# Patient Record
Sex: MALE | Race: BLACK OR AFRICAN AMERICAN | NOT HISPANIC OR LATINO | URBAN - METROPOLITAN AREA
[De-identification: names, ages, dates, MRNs, and addresses within clinical notes are randomized per-mention and may not be internally consistent; named-entity substitution may affect disease eponyms.]

---

## 2021-03-20 ENCOUNTER — INPATIENT (INPATIENT)
Facility: HOSPITAL | Age: 41
LOS: 0 days | Discharge: OTHER INSTITUTION | DRG: 313 | End: 2021-03-21
Attending: HOSPITALIST | Admitting: HOSPITALIST
Payer: COMMERCIAL

## 2021-03-20 VITALS
HEART RATE: 104 BPM | WEIGHT: 210.1 LBS | DIASTOLIC BLOOD PRESSURE: 81 MMHG | HEIGHT: 71 IN | SYSTOLIC BLOOD PRESSURE: 129 MMHG | OXYGEN SATURATION: 93 % | TEMPERATURE: 102 F | RESPIRATION RATE: 16 BRPM

## 2021-03-20 DIAGNOSIS — U07.1 COVID-19: ICD-10-CM

## 2021-03-20 LAB
ALBUMIN SERPL ELPH-MCNC: 3.4 G/DL — SIGNIFICANT CHANGE UP (ref 3.3–5)
ALP SERPL-CCNC: 62 U/L — SIGNIFICANT CHANGE UP (ref 40–120)
ALT FLD-CCNC: 100 U/L — HIGH (ref 10–45)
ANION GAP SERPL CALC-SCNC: 15 MMOL/L — SIGNIFICANT CHANGE UP (ref 5–17)
AST SERPL-CCNC: 119 U/L — HIGH (ref 10–40)
BASOPHILS # BLD AUTO: 0 K/UL — SIGNIFICANT CHANGE UP (ref 0–0.2)
BASOPHILS NFR BLD AUTO: 0 % — SIGNIFICANT CHANGE UP (ref 0–2)
BILIRUB SERPL-MCNC: 0.6 MG/DL — SIGNIFICANT CHANGE UP (ref 0.2–1.2)
BUN SERPL-MCNC: 12 MG/DL — SIGNIFICANT CHANGE UP (ref 7–23)
CALCIUM SERPL-MCNC: 8.9 MG/DL — SIGNIFICANT CHANGE UP (ref 8.4–10.5)
CHLORIDE SERPL-SCNC: 96 MMOL/L — SIGNIFICANT CHANGE UP (ref 96–108)
CO2 SERPL-SCNC: 23 MMOL/L — SIGNIFICANT CHANGE UP (ref 22–31)
CREAT SERPL-MCNC: 1.23 MG/DL — SIGNIFICANT CHANGE UP (ref 0.5–1.3)
CRP SERPL-MCNC: 2.44 MG/DL — HIGH (ref 0–0.4)
D DIMER BLD IA.RAPID-MCNC: 243 NG/ML DDU — HIGH
EOSINOPHIL # BLD AUTO: 0 K/UL — SIGNIFICANT CHANGE UP (ref 0–0.5)
EOSINOPHIL NFR BLD AUTO: 0 % — SIGNIFICANT CHANGE UP (ref 0–6)
GAS PNL BLDV: SIGNIFICANT CHANGE UP
GLUCOSE SERPL-MCNC: 127 MG/DL — HIGH (ref 70–99)
HCT VFR BLD CALC: 47.6 % — SIGNIFICANT CHANGE UP (ref 39–50)
HGB BLD-MCNC: 15.8 G/DL — SIGNIFICANT CHANGE UP (ref 13–17)
IMM GRANULOCYTES NFR BLD AUTO: 0.2 % — SIGNIFICANT CHANGE UP (ref 0–1.5)
LYMPHOCYTES # BLD AUTO: 1.17 K/UL — SIGNIFICANT CHANGE UP (ref 1–3.3)
LYMPHOCYTES # BLD AUTO: 27.3 % — SIGNIFICANT CHANGE UP (ref 13–44)
MCHC RBC-ENTMCNC: 28.8 PG — SIGNIFICANT CHANGE UP (ref 27–34)
MCHC RBC-ENTMCNC: 33.2 GM/DL — SIGNIFICANT CHANGE UP (ref 32–36)
MCV RBC AUTO: 86.9 FL — SIGNIFICANT CHANGE UP (ref 80–100)
MONOCYTES # BLD AUTO: 0.35 K/UL — SIGNIFICANT CHANGE UP (ref 0–0.9)
MONOCYTES NFR BLD AUTO: 8.2 % — SIGNIFICANT CHANGE UP (ref 2–14)
NEUTROPHILS # BLD AUTO: 2.76 K/UL — SIGNIFICANT CHANGE UP (ref 1.8–7.4)
NEUTROPHILS NFR BLD AUTO: 64.3 % — SIGNIFICANT CHANGE UP (ref 43–77)
NRBC # BLD: 0 /100 WBCS — SIGNIFICANT CHANGE UP (ref 0–0)
PLATELET # BLD AUTO: 174 K/UL — SIGNIFICANT CHANGE UP (ref 150–400)
POTASSIUM SERPL-MCNC: 4.2 MMOL/L — SIGNIFICANT CHANGE UP (ref 3.5–5.3)
POTASSIUM SERPL-SCNC: 4.2 MMOL/L — SIGNIFICANT CHANGE UP (ref 3.5–5.3)
PROCALCITONIN SERPL-MCNC: 0.15 NG/ML — HIGH (ref 0.02–0.1)
PROT SERPL-MCNC: 7.6 G/DL — SIGNIFICANT CHANGE UP (ref 6–8.3)
RBC # BLD: 5.48 M/UL — SIGNIFICANT CHANGE UP (ref 4.2–5.8)
RBC # FLD: 12.8 % — SIGNIFICANT CHANGE UP (ref 10.3–14.5)
SARS-COV-2 RNA SPEC QL NAA+PROBE: DETECTED
SODIUM SERPL-SCNC: 134 MMOL/L — LOW (ref 135–145)
TROPONIN T, HIGH SENSITIVITY RESULT: <6 NG/L — SIGNIFICANT CHANGE UP (ref 0–51)
WBC # BLD: 4.29 K/UL — SIGNIFICANT CHANGE UP (ref 3.8–10.5)
WBC # FLD AUTO: 4.29 K/UL — SIGNIFICANT CHANGE UP (ref 3.8–10.5)

## 2021-03-20 PROCEDURE — 93010 ELECTROCARDIOGRAM REPORT: CPT | Mod: NC

## 2021-03-20 PROCEDURE — 99285 EMERGENCY DEPT VISIT HI MDM: CPT

## 2021-03-20 PROCEDURE — 71045 X-RAY EXAM CHEST 1 VIEW: CPT | Mod: 26

## 2021-03-20 PROCEDURE — 71275 CT ANGIOGRAPHY CHEST: CPT | Mod: 26,MA

## 2021-03-20 RX ORDER — DEXAMETHASONE 0.5 MG/5ML
6 ELIXIR ORAL ONCE
Refills: 0 | Status: COMPLETED | OUTPATIENT
Start: 2021-03-20 | End: 2021-03-20

## 2021-03-20 RX ORDER — ACETAMINOPHEN 500 MG
650 TABLET ORAL ONCE
Refills: 0 | Status: COMPLETED | OUTPATIENT
Start: 2021-03-20 | End: 2021-03-20

## 2021-03-20 RX ADMIN — Medication 650 MILLIGRAM(S): at 17:49

## 2021-03-20 RX ADMIN — Medication 6 MILLIGRAM(S): at 17:49

## 2021-03-20 NOTE — ED PROVIDER NOTE - CLINICAL SUMMARY MEDICAL DECISION MAKING FREE TEXT BOX
45M w/ h/o unprovoked PE, pw sob, covid + 9 days ago, 9 days of symptoms, hypoxic, could be covid related, could be PE, will obtain PE as patient is high risk, hypoxia could be combination of both, improved on NC, no hypotension, will monitor for now, tba

## 2021-03-20 NOTE — ED PROVIDER NOTE - PHYSICAL EXAMINATION
CONSTITUTIONAL: NAD, awake, alert  HEAD: Normocephalic; atraumatic  ENMT: External appears normal, MMM  CARD: Normal Sl, S2; no audible murmurs  RESP: hypoxia, on NC 4L sating well  ABD: soft, non-distended; non-tender  MSK: no edema, normal ROM in all four extremities  SKIN: Warm, dry, no rashes  NEURO: aaox3, moving all extremities spontaneously

## 2021-03-20 NOTE — ED PROVIDER NOTE - ATTENDING CONTRIBUTION TO CARE
I performed a history and physical exam of the patient and discussed their management with the resident. I reviewed the resident's note and agree with the documented findings and plan of care. My medical decision making and observations are found above.    40M hx remote pes unclear etiology not on AC p/w sob and cp x1w. Cp feels like prior pe. Also has fevers/chills/cough. cp worse with cough/deep breathing. +sick covid contacts at work last week. Denies leg swelling, recent travel/hospitalizations. On exam, appears weak, slightly tachypneic 88% on RA 96% on NC. cv rrr no m/r/g lungs bibasilar rales and mild accessory muscle use. Abd soft, ntnd. no lower extremity edema. 2+ distal pulses. Covid vs bacterial pna vs pe vs myocarditis. will get labs, cta, supportive care, TBA.

## 2021-03-20 NOTE — ED ADULT NURSE NOTE - OBJECTIVE STATEMENT
Patient  is  alert  and  oriented x3.  Color is  good  and skin warm to touch. He  is  c/o  fever, cough and  SOB.  Patient  was  hypoxic  upon  to  ER  arrival.

## 2021-03-20 NOTE — ED PROVIDER NOTE - OBJECTIVE STATEMENT
40M w/ h/o unprovoked PE in the past, treated with coumadin which is now dc'ed, presents w/ 9 days of SOB, fevers, chills, chest pressure, body aches. Denies recent immobilization, surgeries. States his shortness of breath is worsening. Denies lightheadedness

## 2021-03-20 NOTE — ED PROVIDER NOTE - PROGRESS NOTE DETAILS
Resident Allan: preliminary evaluation consistent with Acute Hypoxemic Resp Failure in the setting of COVID-19. Stable on 4L NC, does not require NPPV at this time. Case endorsed to Hospitalist (MD Ortiz). Stable for floor at present, pending CTA Chest read.

## 2021-03-20 NOTE — ED PROVIDER NOTE - NS ED ROS FT
General: + fever, + chills  HENT: + nasal congestion, + rhinorrhea  CV: + chest pain, denies palpitations  Resp: + difficulty breathing, + cough  Abdominal: denies nausea, vomiting, diarrhea, abdominal pain  MSK: denies muscle aches, leg swelling  Neuro: denies headaches, numbness, tingling  Skin: denies rashes, bruises  Heme: denies petechia, abnormal bleeding

## 2021-03-20 NOTE — ED ADULT NURSE REASSESSMENT NOTE - NS ED NURSE REASSESS COMMENT FT1
Pt resting comfortably without complaints & does not appear to be in any acute distress at this time with VSS. Pt aware that he is currently pending CT scan.

## 2021-03-21 ENCOUNTER — INPATIENT (INPATIENT)
Facility: HOSPITAL | Age: 41
LOS: 2 days | Discharge: HOME | End: 2021-03-24
Attending: INTERNAL MEDICINE | Admitting: INTERNAL MEDICINE
Payer: COMMERCIAL

## 2021-03-21 VITALS
SYSTOLIC BLOOD PRESSURE: 105 MMHG | OXYGEN SATURATION: 95 % | RESPIRATION RATE: 18 BRPM | HEART RATE: 76 BPM | DIASTOLIC BLOOD PRESSURE: 73 MMHG | TEMPERATURE: 98 F

## 2021-03-21 VITALS
RESPIRATION RATE: 20 BRPM | WEIGHT: 183.87 LBS | SYSTOLIC BLOOD PRESSURE: 138 MMHG | TEMPERATURE: 99 F | DIASTOLIC BLOOD PRESSURE: 86 MMHG | OXYGEN SATURATION: 97 % | HEIGHT: 71 IN | HEART RATE: 73 BPM

## 2021-03-21 DIAGNOSIS — R74.01 ELEVATION OF LEVELS OF LIVER TRANSAMINASE LEVELS: ICD-10-CM

## 2021-03-21 DIAGNOSIS — Z29.9 ENCOUNTER FOR PROPHYLACTIC MEASURES, UNSPECIFIED: ICD-10-CM

## 2021-03-21 DIAGNOSIS — U07.1 COVID-19: ICD-10-CM

## 2021-03-21 LAB
ALBUMIN SERPL ELPH-MCNC: 3.5 G/DL — SIGNIFICANT CHANGE UP (ref 3.3–5)
ALBUMIN SERPL ELPH-MCNC: 3.9 G/DL — SIGNIFICANT CHANGE UP (ref 3.3–5)
ALP SERPL-CCNC: 36 U/L — LOW (ref 40–120)
ALP SERPL-CCNC: 66 U/L — SIGNIFICANT CHANGE UP (ref 40–120)
ALT FLD-CCNC: 108 U/L — HIGH (ref 10–45)
ALT FLD-CCNC: 110 U/L — HIGH (ref 10–45)
ANION GAP SERPL CALC-SCNC: 14 MMOL/L — SIGNIFICANT CHANGE UP (ref 5–17)
ANION GAP SERPL CALC-SCNC: 23 MMOL/L — HIGH (ref 5–17)
APTT BLD: 34 SEC — SIGNIFICANT CHANGE UP (ref 27.5–35.5)
AST SERPL-CCNC: 113 U/L — HIGH (ref 10–40)
AST SERPL-CCNC: 93 U/L — HIGH (ref 10–40)
BASOPHILS # BLD AUTO: 0 K/UL — SIGNIFICANT CHANGE UP (ref 0–0.2)
BASOPHILS NFR BLD AUTO: 0 % — SIGNIFICANT CHANGE UP (ref 0–2)
BILIRUB SERPL-MCNC: 0.7 MG/DL — SIGNIFICANT CHANGE UP (ref 0.2–1.2)
BILIRUB SERPL-MCNC: 0.7 MG/DL — SIGNIFICANT CHANGE UP (ref 0.2–1.2)
BUN SERPL-MCNC: 17 MG/DL — SIGNIFICANT CHANGE UP (ref 7–23)
BUN SERPL-MCNC: 19 MG/DL — SIGNIFICANT CHANGE UP (ref 7–23)
CA-I BLD-SCNC: 1.2 MMOL/L — SIGNIFICANT CHANGE UP (ref 1.12–1.3)
CALCIUM SERPL-MCNC: 9.6 MG/DL — SIGNIFICANT CHANGE UP (ref 8.4–10.5)
CALCIUM SERPL-MCNC: <3 MG/DL — CRITICAL LOW (ref 8.4–10.5)
CHLORIDE SERPL-SCNC: 88 MMOL/L — LOW (ref 96–108)
CHLORIDE SERPL-SCNC: 96 MMOL/L — SIGNIFICANT CHANGE UP (ref 96–108)
CO2 SERPL-SCNC: 18 MMOL/L — LOW (ref 22–31)
CO2 SERPL-SCNC: 27 MMOL/L — SIGNIFICANT CHANGE UP (ref 22–31)
CREAT SERPL-MCNC: 0.94 MG/DL — SIGNIFICANT CHANGE UP (ref 0.5–1.3)
CREAT SERPL-MCNC: 0.98 MG/DL — SIGNIFICANT CHANGE UP (ref 0.5–1.3)
EOSINOPHIL # BLD AUTO: 0 K/UL — SIGNIFICANT CHANGE UP (ref 0–0.5)
EOSINOPHIL NFR BLD AUTO: 0 % — SIGNIFICANT CHANGE UP (ref 0–6)
FERRITIN SERPL-MCNC: 1209 NG/ML — HIGH (ref 30–400)
GIANT PLATELETS BLD QL SMEAR: PRESENT — SIGNIFICANT CHANGE UP
GLUCOSE SERPL-MCNC: 133 MG/DL — HIGH (ref 70–99)
GLUCOSE SERPL-MCNC: 140 MG/DL — HIGH (ref 70–99)
HCT VFR BLD CALC: 51.7 % — HIGH (ref 39–50)
HCT VFR BLD CALC: 53.2 % — HIGH (ref 39–50)
HGB BLD-MCNC: 17.3 G/DL — HIGH (ref 13–17)
HGB BLD-MCNC: 17.5 G/DL — HIGH (ref 13–17)
LYMPHOCYTES # BLD AUTO: 0.52 K/UL — LOW (ref 1–3.3)
LYMPHOCYTES # BLD AUTO: 24.3 % — SIGNIFICANT CHANGE UP (ref 13–44)
MAGNESIUM SERPL-MCNC: 2.6 MG/DL — SIGNIFICANT CHANGE UP (ref 1.6–2.6)
MAGNESIUM SERPL-MCNC: <.6 MG/DL — CRITICAL LOW (ref 1.6–2.6)
MANUAL SMEAR VERIFICATION: SIGNIFICANT CHANGE UP
MCHC RBC-ENTMCNC: 28.7 PG — SIGNIFICANT CHANGE UP (ref 27–34)
MCHC RBC-ENTMCNC: 29 PG — SIGNIFICANT CHANGE UP (ref 27–34)
MCHC RBC-ENTMCNC: 32.9 GM/DL — SIGNIFICANT CHANGE UP (ref 32–36)
MCHC RBC-ENTMCNC: 33.5 GM/DL — SIGNIFICANT CHANGE UP (ref 32–36)
MCV RBC AUTO: 86.7 FL — SIGNIFICANT CHANGE UP (ref 80–100)
MCV RBC AUTO: 87.2 FL — SIGNIFICANT CHANGE UP (ref 80–100)
MONOCYTES # BLD AUTO: 0.18 K/UL — SIGNIFICANT CHANGE UP (ref 0–0.9)
MONOCYTES NFR BLD AUTO: 8.4 % — SIGNIFICANT CHANGE UP (ref 2–14)
NEUTROPHILS # BLD AUTO: 1.43 K/UL — LOW (ref 1.8–7.4)
NEUTROPHILS NFR BLD AUTO: 65.4 % — SIGNIFICANT CHANGE UP (ref 43–77)
NEUTS BAND # BLD: 1 % — SIGNIFICANT CHANGE UP (ref 0–8)
NRBC # BLD: 0 /100 WBCS — SIGNIFICANT CHANGE UP (ref 0–0)
PHOSPHATE SERPL-MCNC: 4.1 MG/DL — SIGNIFICANT CHANGE UP (ref 2.5–4.5)
PLAT MORPH BLD: NORMAL — SIGNIFICANT CHANGE UP
PLATELET # BLD AUTO: 126 K/UL — LOW (ref 150–400)
PLATELET # BLD AUTO: 202 K/UL — SIGNIFICANT CHANGE UP (ref 150–400)
POTASSIUM SERPL-MCNC: 4.6 MMOL/L — SIGNIFICANT CHANGE UP (ref 3.5–5.3)
POTASSIUM SERPL-MCNC: >9 MMOL/L — CRITICAL HIGH (ref 3.5–5.3)
POTASSIUM SERPL-SCNC: 4.6 MMOL/L — SIGNIFICANT CHANGE UP (ref 3.5–5.3)
POTASSIUM SERPL-SCNC: >9 MMOL/L — CRITICAL HIGH (ref 3.5–5.3)
PROT SERPL-MCNC: 8.1 G/DL — SIGNIFICANT CHANGE UP (ref 6–8.3)
PROT SERPL-MCNC: 8.3 G/DL — SIGNIFICANT CHANGE UP (ref 6–8.3)
RBC # BLD: 5.96 M/UL — HIGH (ref 4.2–5.8)
RBC # BLD: 6.1 M/UL — HIGH (ref 4.2–5.8)
RBC # FLD: 12.9 % — SIGNIFICANT CHANGE UP (ref 10.3–14.5)
RBC # FLD: 13 % — SIGNIFICANT CHANGE UP (ref 10.3–14.5)
RBC BLD AUTO: SIGNIFICANT CHANGE UP
SODIUM SERPL-SCNC: 129 MMOL/L — LOW (ref 135–145)
SODIUM SERPL-SCNC: 137 MMOL/L — SIGNIFICANT CHANGE UP (ref 135–145)
VARIANT LYMPHS # BLD: 0.9 % — SIGNIFICANT CHANGE UP (ref 0–6)
WBC # BLD: 1.51 K/UL — LOW (ref 3.8–10.5)
WBC # BLD: 2.16 K/UL — LOW (ref 3.8–10.5)
WBC # FLD AUTO: 1.51 K/UL — LOW (ref 3.8–10.5)
WBC # FLD AUTO: 2.16 K/UL — LOW (ref 3.8–10.5)

## 2021-03-21 PROCEDURE — 85014 HEMATOCRIT: CPT

## 2021-03-21 PROCEDURE — 84295 ASSAY OF SERUM SODIUM: CPT

## 2021-03-21 PROCEDURE — 99222 1ST HOSP IP/OBS MODERATE 55: CPT

## 2021-03-21 PROCEDURE — 84100 ASSAY OF PHOSPHORUS: CPT

## 2021-03-21 PROCEDURE — 84484 ASSAY OF TROPONIN QUANT: CPT

## 2021-03-21 PROCEDURE — 85025 COMPLETE CBC W/AUTO DIFF WBC: CPT

## 2021-03-21 PROCEDURE — 71275 CT ANGIOGRAPHY CHEST: CPT

## 2021-03-21 PROCEDURE — U0003: CPT

## 2021-03-21 PROCEDURE — 80053 COMPREHEN METABOLIC PANEL: CPT

## 2021-03-21 PROCEDURE — 82330 ASSAY OF CALCIUM: CPT

## 2021-03-21 PROCEDURE — 99223 1ST HOSP IP/OBS HIGH 75: CPT

## 2021-03-21 PROCEDURE — 12345: CPT | Mod: NC

## 2021-03-21 PROCEDURE — 99285 EMERGENCY DEPT VISIT HI MDM: CPT | Mod: 25

## 2021-03-21 PROCEDURE — 86769 SARS-COV-2 COVID-19 ANTIBODY: CPT

## 2021-03-21 PROCEDURE — 82435 ASSAY OF BLOOD CHLORIDE: CPT

## 2021-03-21 PROCEDURE — 83735 ASSAY OF MAGNESIUM: CPT

## 2021-03-21 PROCEDURE — 85379 FIBRIN DEGRADATION QUANT: CPT

## 2021-03-21 PROCEDURE — 96374 THER/PROPH/DIAG INJ IV PUSH: CPT

## 2021-03-21 PROCEDURE — 82803 BLOOD GASES ANY COMBINATION: CPT

## 2021-03-21 PROCEDURE — 85018 HEMOGLOBIN: CPT

## 2021-03-21 PROCEDURE — 86140 C-REACTIVE PROTEIN: CPT

## 2021-03-21 PROCEDURE — 82728 ASSAY OF FERRITIN: CPT

## 2021-03-21 PROCEDURE — 82947 ASSAY GLUCOSE BLOOD QUANT: CPT

## 2021-03-21 PROCEDURE — 83605 ASSAY OF LACTIC ACID: CPT

## 2021-03-21 PROCEDURE — 85730 THROMBOPLASTIN TIME PARTIAL: CPT

## 2021-03-21 PROCEDURE — 99233 SBSQ HOSP IP/OBS HIGH 50: CPT

## 2021-03-21 PROCEDURE — 84132 ASSAY OF SERUM POTASSIUM: CPT

## 2021-03-21 PROCEDURE — 84145 PROCALCITONIN (PCT): CPT

## 2021-03-21 PROCEDURE — 71045 X-RAY EXAM CHEST 1 VIEW: CPT

## 2021-03-21 PROCEDURE — U0005: CPT

## 2021-03-21 RX ORDER — DEXAMETHASONE 0.5 MG/5ML
6 ELIXIR ORAL DAILY
Refills: 0 | Status: DISCONTINUED | OUTPATIENT
Start: 2021-03-21 | End: 2021-03-24

## 2021-03-21 RX ORDER — SIMETHICONE 80 MG/1
80 TABLET, CHEWABLE ORAL ONCE
Refills: 0 | Status: DISCONTINUED | OUTPATIENT
Start: 2021-03-21 | End: 2021-03-21

## 2021-03-21 RX ORDER — REMDESIVIR 5 MG/ML
100 INJECTION INTRAVENOUS EVERY 24 HOURS
Refills: 0 | Status: DISCONTINUED | OUTPATIENT
Start: 2021-03-22 | End: 2021-03-21

## 2021-03-21 RX ORDER — ENOXAPARIN SODIUM 100 MG/ML
40 INJECTION SUBCUTANEOUS DAILY
Refills: 0 | Status: DISCONTINUED | OUTPATIENT
Start: 2021-03-21 | End: 2021-03-21

## 2021-03-21 RX ORDER — REMDESIVIR 5 MG/ML
200 INJECTION INTRAVENOUS EVERY 24 HOURS
Refills: 0 | Status: COMPLETED | OUTPATIENT
Start: 2021-03-21 | End: 2021-03-21

## 2021-03-21 RX ORDER — DEXAMETHASONE 0.5 MG/5ML
1 ELIXIR ORAL
Qty: 0 | Refills: 0 | DISCHARGE
Start: 2021-03-21

## 2021-03-21 RX ORDER — ACETAMINOPHEN 500 MG
2 TABLET ORAL
Qty: 0 | Refills: 0 | DISCHARGE
Start: 2021-03-21

## 2021-03-21 RX ORDER — SIMETHICONE 80 MG/1
80 TABLET, CHEWABLE ORAL DAILY
Refills: 0 | Status: DISCONTINUED | OUTPATIENT
Start: 2021-03-21 | End: 2021-03-24

## 2021-03-21 RX ORDER — REMDESIVIR 5 MG/ML
INJECTION INTRAVENOUS
Refills: 0 | Status: DISCONTINUED | OUTPATIENT
Start: 2021-03-21 | End: 2021-03-21

## 2021-03-21 RX ORDER — ENOXAPARIN SODIUM 100 MG/ML
40 INJECTION SUBCUTANEOUS DAILY
Refills: 0 | Status: DISCONTINUED | OUTPATIENT
Start: 2021-03-21 | End: 2021-03-24

## 2021-03-21 RX ORDER — INFLUENZA VIRUS VACCINE 15; 15; 15; 15 UG/.5ML; UG/.5ML; UG/.5ML; UG/.5ML
0.5 SUSPENSION INTRAMUSCULAR ONCE
Refills: 0 | Status: DISCONTINUED | OUTPATIENT
Start: 2021-03-21 | End: 2021-03-21

## 2021-03-21 RX ORDER — REMDESIVIR 5 MG/ML
100 INJECTION INTRAVENOUS EVERY 24 HOURS
Refills: 0 | Status: DISCONTINUED | OUTPATIENT
Start: 2021-03-21 | End: 2021-03-24

## 2021-03-21 RX ORDER — REMDESIVIR 5 MG/ML
0 INJECTION INTRAVENOUS
Qty: 0 | Refills: 0 | DISCHARGE
Start: 2021-03-21

## 2021-03-21 RX ORDER — ACETAMINOPHEN 500 MG
650 TABLET ORAL EVERY 4 HOURS
Refills: 0 | Status: DISCONTINUED | OUTPATIENT
Start: 2021-03-21 | End: 2021-03-21

## 2021-03-21 RX ORDER — ACETAMINOPHEN 500 MG
650 TABLET ORAL EVERY 6 HOURS
Refills: 0 | Status: DISCONTINUED | OUTPATIENT
Start: 2021-03-21 | End: 2021-03-24

## 2021-03-21 RX ORDER — DEXAMETHASONE 0.5 MG/5ML
6 ELIXIR ORAL DAILY
Refills: 0 | Status: DISCONTINUED | OUTPATIENT
Start: 2021-03-21 | End: 2021-03-21

## 2021-03-21 RX ADMIN — REMDESIVIR 500 MILLIGRAM(S): 5 INJECTION INTRAVENOUS at 10:03

## 2021-03-21 RX ADMIN — ENOXAPARIN SODIUM 40 MILLIGRAM(S): 100 INJECTION SUBCUTANEOUS at 12:03

## 2021-03-21 RX ADMIN — Medication 6 MILLIGRAM(S): at 05:35

## 2021-03-21 NOTE — H&P ADULT - ASSESSMENT
40M w/ hx of unprovoked PE previously on lovenox & TB s/p treatment(2008) presents to [facility] with sob and recent diagnosis of COVID19 admit for acute respiratory failure

## 2021-03-21 NOTE — H&P ADULT - PROBLEM SELECTOR PLAN 1
- c/w dexamethasone and start remdesivir  - lovenox for dvt ppx  - continue with supplemental oxygen prn  - CTA chest negative for acute PE

## 2021-03-21 NOTE — H&P ADULT - NSHPREVIEWOFSYSTEMS_GEN_ALL_CORE
CONSTITUTIONAL: fever+, chills+  EYES: No eye pain, no acute blindness  Mouth: no pain in mouth, no cuts  RESPIRATORY: cough+, sob+  CARDIOVASCULAR: chest discomfort with deep breath+, no palpitations  GASTROINTESTINAL: no abdominal pain, no n/v/d  GENITOURINARY: No dysuria, no hematuria  Heme: No easy bruising, no swelling of neck  NEUROLOGICAL: No seizure, No acute paralysis  SKIN: No itching, no rashes  MUSCULOSKELETAL: No acute joint pain, no joint swelling

## 2021-03-21 NOTE — DISCHARGE NOTE NURSING/CASE MANAGEMENT/SOCIAL WORK - PATIENT PORTAL LINK FT
You can access the FollowMyHealth Patient Portal offered by [facility] by registering at the following website: [email or web address]. By joining [facility]’s FollowMyHealth portal, you will also be able to view your health information using other applications (apps) compatible with our system.

## 2021-03-21 NOTE — H&P ADULT - HISTORY OF PRESENT ILLNESS
40M w/ hx of unprovoked PE previously on lovenox & TB s/p treatment(2008) presents to [facility] with sob and recent diagnosis of COVID19. The patient reports sick contact at place of employment and developed symptoms last Thursday with chills, fever, muscle ache, dry cough, sob, chest discomfort with deep breath and completed COVID testing which returned positive. He presents on day of admit due to worsening sob at rest and history of PE. He denies hemoptysis, malignancy, prolonged immobility and lower extremity swelling.    In the ED, .2, 129/81, 104, 16 93%RA-> 20 95% on 2LNC  s/p dexamethasone 6mgIV (17:49) acetaminophen

## 2021-03-21 NOTE — H&P ADULT - PROBLEM SELECTOR PLAN 2
likely from covid19  monitor w/ cmp daily  if escalating while on remdesivir, may require ID consult

## 2021-03-21 NOTE — CHART NOTE - NSCHARTNOTEFT_GEN_A_CORE
pt feeling somewhat better.  pt being transferred [facility] for ongoing covid care  I spoke with his wife and updated her. all questions were answered.

## 2021-03-21 NOTE — DISCHARGE NOTE PROVIDER - NSDCCPCAREPLAN_GEN_ALL_CORE_FT
PRINCIPAL DISCHARGE DIAGNOSIS  Diagnosis: Acute hypoxemic respiratory failure due to COVID-19  Assessment and Plan of Treatment: strated on Remdesivir, Decadron, O2 2L via n/c 97%  transfer to [facility] for continued care

## 2021-03-21 NOTE — H&P ADULT - NSHPSOCIALHISTORY_GEN_ALL_CORE
former smoker (quit 3 yr prior; 1ppd for 10 yr), social alcohol, previous rare social marijuana and no other drugs. lives with wife who is not sick.

## 2021-03-21 NOTE — H&P ADULT - NSHPPHYSICALEXAM_GEN_ALL_CORE
Vital Signs Last 24 Hrs  T(C): 36.5 (21 Mar 2021 02:36), Max: 39 (20 Mar 2021 16:22)  T(F): 97.7 (21 Mar 2021 02:36), Max: 102.2 (20 Mar 2021 16:22)  HR: 67 (21 Mar 2021 02:36) (67 - 104)  BP: 132/82 (21 Mar 2021 02:36) (121/72 - 132/82)  RR: 18 (21 Mar 2021 02:36) (16 - 20)  SpO2: 98% (21 Mar 2021 02:36) (93% - 98%) on 2L NC    GENERAL: NAD, well-developed  HEAD:  Atraumatic, Normocephalic  EYES: EOMI, PERRL, conjunctiva and sclera clear  Mouth: MMM, no lesions  NECK: Supple, no appreciable masses  Lung: normal work of breathing, mild crackle b.l  Chest: S1&S2+, rrr, no m/r/g appreciated  ABDOMEN: bs+, soft, nt, nd, no appreciable masses  : No harris catheter, no CVA tenderness  EXTREMITIES:  radial pulse present b/l, PT present b/l, no pitting edema present  Neuro: Alert and follows commands, no flaccid paralysis in extremities appreciated  SKIN: warm and dry, no visible purulence in exposed areas

## 2021-03-21 NOTE — H&P ADULT - ATTENDING COMMENTS
Markus More MD  Medicine Attending  Department of Hospital Medicine  pager: [phone] (available from 20:00 to 08:00)

## 2021-03-21 NOTE — H&P ADULT - NSHPLABSRESULTS_GEN_ALL_CORE
Personally reviewed available labs, imaging and ekg  CBC Full  -  ( 20 Mar 2021 17:44 )  WBC Count : 4.29 K/uL  RBC Count : 5.48 M/uL  Hemoglobin : 15.8 g/dL  Hematocrit : 47.6 %  Platelet Count - Automated : 174 K/uL  Mean Cell Volume : 86.9 fl  Mean Cell Hemoglobin : 28.8 pg  Mean Cell Hemoglobin Concentration : 33.2 gm/dL  Auto Neutrophil # : 2.76 K/uL  Auto Lymphocyte # : 1.17 K/uL  Auto Monocyte # : 0.35 K/uL  Auto Eosinophil # : 0.00 K/uL  Auto Basophil # : 0.00 K/uL  Auto Neutrophil % : 64.3 %  Auto Lymphocyte % : 27.3 %  Auto Monocyte % : 8.2 %  Auto Eosinophil % : 0.0 %  Auto Basophil % : 0.0 %  03-20  134<L>  |  96  |  12  ----------------------------<  127<H>  4.2   |  23  |  1.23  Ca    8.9      20 Mar 2021 17:44  TPro  7.6  /  Alb  3.4  /  TBili  0.6  /  DBili  x   /  AST  119<H>  /  ALT  100<H>  /  AlkPhos  62  03-20  Imaging: CTA chest demonstrates ggo, no saddle pe appreciated  EKG: nsr 94bpm

## 2021-03-21 NOTE — DISCHARGE NOTE PROVIDER - HOSPITAL COURSE
40M w/ hx of unprovoked PE previously on Lovenox & TB s/p treatment(2008) presents to [facility] with sob and recent diagnosis of COVID19.  Patient given Remdesivir, Po Decadron, O2 2L via n/c 97%. Transfer to [facility] for continued care.   40M w/ hx of unprovoked PE previously on Lovenox & TB s/p treatment(2008) presents to [facility] with sob and recent diagnosis of COVID19.  Patient given Remdesivir, Po Decadron, O2 2L via n/c 97%. Transfer to [facility] for continued care

## 2021-03-21 NOTE — DISCHARGE NOTE PROVIDER - NSDCMRMEDTOKEN_GEN_ALL_CORE_FT
acetaminophen 325 mg oral tablet: 2 tab(s) orally every 4 hours, As needed, Temp greater or equal to 38.5C (101.3F)  dexamethasone 6 mg oral tablet: 1 tab(s) orally once a day  enoxaparin 40 mg/0.4 mL injectable solution: 40 milligram(s) injectable once a day  remdesivir 5 mg/mL intravenous solution:  intravenous

## 2021-03-22 DIAGNOSIS — R74.01 ELEVATION OF LEVELS OF LIVER TRANSAMINASE LEVELS: ICD-10-CM

## 2021-03-22 DIAGNOSIS — J96.00 ACUTE RESPIRATORY FAILURE, UNSPECIFIED WHETHER WITH HYPOXIA OR HYPERCAPNIA: ICD-10-CM

## 2021-03-22 DIAGNOSIS — Z29.9 ENCOUNTER FOR PROPHYLACTIC MEASURES, UNSPECIFIED: ICD-10-CM

## 2021-03-22 LAB
ALBUMIN SERPL ELPH-MCNC: 2.6 G/DL — LOW (ref 3.3–5)
ALP SERPL-CCNC: 56 U/L — SIGNIFICANT CHANGE UP (ref 40–120)
ALT FLD-CCNC: 116 U/L — HIGH (ref 12–78)
ANION GAP SERPL CALC-SCNC: 7 MMOL/L — SIGNIFICANT CHANGE UP (ref 5–17)
AST SERPL-CCNC: 72 U/L — HIGH (ref 15–37)
BASOPHILS # BLD AUTO: 0 K/UL — SIGNIFICANT CHANGE UP (ref 0–0.2)
BASOPHILS NFR BLD AUTO: 0 % — SIGNIFICANT CHANGE UP (ref 0–2)
BILIRUB SERPL-MCNC: 0.7 MG/DL — SIGNIFICANT CHANGE UP (ref 0.2–1.2)
BUN SERPL-MCNC: 21 MG/DL — SIGNIFICANT CHANGE UP (ref 7–23)
CALCIUM SERPL-MCNC: 8.7 MG/DL — SIGNIFICANT CHANGE UP (ref 8.5–10.1)
CHLORIDE SERPL-SCNC: 98 MMOL/L — SIGNIFICANT CHANGE UP (ref 96–108)
CO2 SERPL-SCNC: 29 MMOL/L — SIGNIFICANT CHANGE UP (ref 22–31)
CREAT SERPL-MCNC: 1.06 MG/DL — SIGNIFICANT CHANGE UP (ref 0.5–1.3)
EOSINOPHIL # BLD AUTO: 0 K/UL — SIGNIFICANT CHANGE UP (ref 0–0.5)
EOSINOPHIL NFR BLD AUTO: 0 % — SIGNIFICANT CHANGE UP (ref 0–6)
GLUCOSE SERPL-MCNC: 111 MG/DL — HIGH (ref 70–99)
HCT VFR BLD CALC: 47.9 % — SIGNIFICANT CHANGE UP (ref 39–50)
HGB BLD-MCNC: 15.9 G/DL — SIGNIFICANT CHANGE UP (ref 13–17)
LG PLATELETS BLD QL AUTO: SLIGHT — SIGNIFICANT CHANGE UP
LYMPHOCYTES # BLD AUTO: 0.82 K/UL — LOW (ref 1–3.3)
LYMPHOCYTES # BLD AUTO: 14 % — SIGNIFICANT CHANGE UP (ref 13–44)
MANUAL SMEAR VERIFICATION: SIGNIFICANT CHANGE UP
MCHC RBC-ENTMCNC: 28.5 PG — SIGNIFICANT CHANGE UP (ref 27–34)
MCHC RBC-ENTMCNC: 33.2 GM/DL — SIGNIFICANT CHANGE UP (ref 32–36)
MCV RBC AUTO: 86 FL — SIGNIFICANT CHANGE UP (ref 80–100)
MONOCYTES # BLD AUTO: 0.53 K/UL — SIGNIFICANT CHANGE UP (ref 0–0.9)
MONOCYTES NFR BLD AUTO: 9 % — SIGNIFICANT CHANGE UP (ref 2–14)
NEUTROPHILS # BLD AUTO: 4.53 K/UL — SIGNIFICANT CHANGE UP (ref 1.8–7.4)
NEUTROPHILS NFR BLD AUTO: 77 % — SIGNIFICANT CHANGE UP (ref 43–77)
NRBC # BLD: 0 /100 — SIGNIFICANT CHANGE UP (ref 0–0)
NRBC # BLD: SIGNIFICANT CHANGE UP /100 WBCS (ref 0–0)
PLAT MORPH BLD: ABNORMAL
PLATELET # BLD AUTO: 242 K/UL — SIGNIFICANT CHANGE UP (ref 150–400)
POTASSIUM SERPL-MCNC: 3.8 MMOL/L — SIGNIFICANT CHANGE UP (ref 3.5–5.3)
POTASSIUM SERPL-SCNC: 3.8 MMOL/L — SIGNIFICANT CHANGE UP (ref 3.5–5.3)
PROT SERPL-MCNC: 7.5 GM/DL — SIGNIFICANT CHANGE UP (ref 6–8.3)
RBC # BLD: 5.57 M/UL — SIGNIFICANT CHANGE UP (ref 4.2–5.8)
RBC # FLD: 12.9 % — SIGNIFICANT CHANGE UP (ref 10.3–14.5)
RBC BLD AUTO: NORMAL — SIGNIFICANT CHANGE UP
SMUDGE CELLS # BLD: PRESENT — SIGNIFICANT CHANGE UP
SODIUM SERPL-SCNC: 134 MMOL/L — LOW (ref 135–145)
TOXIC GRANULES BLD QL SMEAR: PRESENT — SIGNIFICANT CHANGE UP
WBC # BLD: 5.88 K/UL — SIGNIFICANT CHANGE UP (ref 3.8–10.5)
WBC # FLD AUTO: 5.88 K/UL — SIGNIFICANT CHANGE UP (ref 3.8–10.5)

## 2021-03-22 PROCEDURE — 99233 SBSQ HOSP IP/OBS HIGH 50: CPT

## 2021-03-22 RX ORDER — CHLORPROMAZINE HCL 10 MG
25 TABLET ORAL THREE TIMES A DAY
Refills: 0 | Status: COMPLETED | OUTPATIENT
Start: 2021-03-22 | End: 2021-03-23

## 2021-03-22 RX ADMIN — Medication 25 MILLIGRAM(S): at 22:28

## 2021-03-22 RX ADMIN — Medication 6 MILLIGRAM(S): at 06:26

## 2021-03-22 RX ADMIN — ENOXAPARIN SODIUM 40 MILLIGRAM(S): 100 INJECTION SUBCUTANEOUS at 12:23

## 2021-03-22 RX ADMIN — REMDESIVIR 500 MILLIGRAM(S): 5 INJECTION INTRAVENOUS at 12:21

## 2021-03-22 RX ADMIN — SIMETHICONE 80 MILLIGRAM(S): 80 TABLET, CHEWABLE ORAL at 12:22

## 2021-03-22 NOTE — H&P ADULT - HISTORY OF PRESENT ILLNESS
Patient was a transfer from [facility] who received one dose of Remdesivir and PO decadron .        40M w/ hx of unprovoked PE previously on lovenox & TB s/p treatment(2008) presents to [facility] 3/21 with sob and recent diagnosis of COVID19. The patient reports sick contact at place of employment and developed symptoms last Thursday with chills, fever, muscle ache, dry cough, sob, chest discomfort with deep breath and completed COVID testing which returned positive. He presents on day of admit due to worsening sob at rest and history of PE. He denies hemoptysis, malignancy, prolonged immobility and lower extremity swelling.    In the ED, .2, 129/81, 104, 16 93%RA-> 20 95% on 2LNC  s/p dexamethasone 6mgIV (17:49) acetaminophen                     :       Patient was a transfer from [facility] who received one dose of Remdesivir and PO decadron .        40M w/ hx of unprovoked PE previously on lovenox & TB s/p treatment(2008) presents to [facility] 3/21 with sob and recent diagnosis of COVID19. The patient reports sick contact at place of employment and developed symptoms last Thursday with chills, fever, muscle ache, dry cough, sob, chest discomfort with deep breath and completed COVID testing which returned positive. He presents on day of admit due to worsening sob at rest and history of PE. He denies hemoptysis, malignancy, prolonged immobility and lower extremity swelling.    In the ED, .2, 129/81, 104, 16 93%RA-> 20 95% on 2LNC  s/p dexamethasone 6mgIV (17:49) acetaminophen

## 2021-03-22 NOTE — H&P ADULT - ATTENDING COMMENTS
Patient transferred from [facility] for load balancing.  COVID+, stable on nasal cannula.  No acute complaints at this time.  To continue decadron, remdesivir

## 2021-03-22 NOTE — H&P ADULT - NSHPPHYSICALEXAM_GEN_ALL_CORE
Physical Exam: Vital Signs Last 24 Hrs  T(C): 36.5 (21 Mar 2021 02:36), Max: 39 (20 Mar 2021 16:22)  T(F): 97.7 (21 Mar 2021 02:36), Max: 102.2 (20 Mar 2021 16:22)  HR: 67 (21 Mar 2021 02:36) (67 - 104)  BP: 132/82 (21 Mar 2021 02:36) (121/72 - 132/82)  RR: 18 (21 Mar 2021 02:36) (16 - 20)  SpO2: 98% (21 Mar 2021 02:36) (93% - 98%) on 2L NC    GENERAL: NAD, well-developed  HEAD:  Atraumatic, Normocephalic  EYES: EOMI, PERRL, conjunctiva and sclera clear  Mouth: MMM, no lesions  NECK: Supple, no appreciable masses  Lung: normal work of breathing, mild crackle b.l  Chest: S1&S2+, rrr, no m/r/g appreciated  ABDOMEN: bs+, soft, nt, nd, no appreciable masses  : No harris catheter, no CVA tenderness  EXTREMITIES:  radial pulse present b/l, PT present b/l, no pitting edema present  Neuro: Alert and follows commands, no flaccid paralysis in extremities appreciated  SKIN: warm and dry, no visible purulence in exposed areas

## 2021-03-22 NOTE — H&P ADULT - NSHPSOCIALHISTORY_GEN_ALL_CORE
former smoker who quit 3 yrs prior. 1ppd x 10 yrs.  socially drinks alcohol,    and lives with wife  previous rare social marijuana and no other drug use

## 2021-03-22 NOTE — H&P ADULT - PROBLEM SELECTOR PLAN 1
c/w dexamethasone 6 mg po x 10 days  c/w remdesivir 100 mg  x 4 days  lovenox for DVT ppx  continue with supplemental oxygen prn NC  CTA chest negative for PE but GGO      Start IV Fluids 1/2 NS@80ml/hr for 24 hrs, then re-evaluate.  Order JAQUELINE lower extremity Doppler to r/o DVT.  Consider X-Ray leg in am if PMD agrees.  Tylenol 650mg po q6hrs PRN for fever/pain.  Send D-Dimer, Protien C, Protien S,   GI Prophylaxis with Protonix 40mg po QDaily.  DVT Prophylaxis on Lovenox c/w dexamethasone 6 mg po x 10 days  c/w remdesivir 100 mg  x 4 days  d dimer 243, procal 0.15, C reactive protein 2.44 , ferritin 1206  lovenox for DVT ppx  continue with supplemental oxygen prn NC  CTA chest negative for PE but GGO      Start IV Fluids 1/2 NS@80ml/hr for 24 hrs, then re-evaluate.  Order JAQUELINE lower extremity Doppler to r/o DVT.  Consider X-Ray leg in am if PMD agrees.  Tylenol 650mg po q6hrs PRN for fever/pain.  Send D-Dimer, Protien C, Protien S,   GI Prophylaxis with Protonix 40mg po QDaily.  DVT Prophylaxis on Lovenox

## 2021-03-22 NOTE — H&P ADULT - ASSESSMENT
40 yr old male with PMH of unprovoked PE previously on lovenox and TB s/p treatment 2008 presented to [facility] 3/21 with SOB and recent diagnosis of Covid 19 . Patient was admitted for acute respiratory failure. Patient was transferred to [facility].

## 2021-03-22 NOTE — H&P ADULT - NSHPLABSRESULTS_GEN_ALL_CORE
EKG NSR  with HR 94    CTA Chest demonstrates GGO, no saddle PE EKG NSR  with HR 94    CTA Chest demonstrates GGO, no saddle PE    cxr IMPRESSION:    Faint patchy opacities in the right midlung and bilaterallower lungs.         RISK                                                          Points  [ x ] Previous VTE                                                3  [  ] Thrombophilia                                             2  [  ] Lower limb paralysis                                   2        (unable to hold up >15 seconds)    [  ] Current Cancer                                             2         (within 6 months)  [  ] Immobilization > 24 hrs                              1  [  ] ICU/CCU stay > 24 hours                             1  [  ] Age > 60                                                         1    IMPROVE VTE Score: 3

## 2021-03-22 NOTE — H&P ADULT - NSHPREVIEWOFSYSTEMS_GEN_ALL_CORE
Review of Systems: CONSTITUTIONAL: fever+, chills+  EYES: No eye pain, no acute blindness  Mouth: no pain in mouth, no cuts  RESPIRATORY: cough+, sob+  CARDIOVASCULAR: chest discomfort with deep breath+, no palpitations  GASTROINTESTINAL: no abdominal pain, no n/v/d  GENITOURINARY: No dysuria, no hematuria  Heme: No easy bruising, no swelling of neck  NEUROLOGICAL: No seizure, No acute paralysis  SKIN: No itching, no rashes  MUSCULOSKELETAL: No acute joint pain, no joint swelling

## 2021-03-22 NOTE — PROGRESS NOTE ADULT - ATTENDING COMMENTS
Patient transferred from [facility] for load balancing.  COVID+, stable on nasal cannula.  No acute complaints at this time.  To continue decadron, remdesivir

## 2021-03-22 NOTE — H&P ADULT - PROBLEM SELECTOR PLAN 2
likely from covid 19  monitor with CMP daily  if escalating while on remdesivir , may require ID consult likely from covid 19   monitor with CMP daily  if escalating while on remdesivir , may require ID consult

## 2021-03-23 LAB
ALBUMIN SERPL ELPH-MCNC: 2.7 G/DL — LOW (ref 3.3–5)
ALP SERPL-CCNC: 51 U/L — SIGNIFICANT CHANGE UP (ref 40–120)
ALT FLD-CCNC: 115 U/L — HIGH (ref 12–78)
ANION GAP SERPL CALC-SCNC: 5 MMOL/L — SIGNIFICANT CHANGE UP (ref 5–17)
AST SERPL-CCNC: 58 U/L — HIGH (ref 15–37)
BILIRUB SERPL-MCNC: 0.7 MG/DL — SIGNIFICANT CHANGE UP (ref 0.2–1.2)
BUN SERPL-MCNC: 19 MG/DL — SIGNIFICANT CHANGE UP (ref 7–23)
CALCIUM SERPL-MCNC: 8.9 MG/DL — SIGNIFICANT CHANGE UP (ref 8.5–10.1)
CHLORIDE SERPL-SCNC: 101 MMOL/L — SIGNIFICANT CHANGE UP (ref 96–108)
CO2 SERPL-SCNC: 30 MMOL/L — SIGNIFICANT CHANGE UP (ref 22–31)
CREAT SERPL-MCNC: 1.09 MG/DL — SIGNIFICANT CHANGE UP (ref 0.5–1.3)
D DIMER BLD IA.RAPID-MCNC: 172 NG/ML DDU — SIGNIFICANT CHANGE UP
GLUCOSE SERPL-MCNC: 94 MG/DL — SIGNIFICANT CHANGE UP (ref 70–99)
HCT VFR BLD CALC: 48.2 % — SIGNIFICANT CHANGE UP (ref 39–50)
HGB BLD-MCNC: 15.8 G/DL — SIGNIFICANT CHANGE UP (ref 13–17)
MAGNESIUM SERPL-MCNC: 2.7 MG/DL — HIGH (ref 1.6–2.6)
MCHC RBC-ENTMCNC: 28.6 PG — SIGNIFICANT CHANGE UP (ref 27–34)
MCHC RBC-ENTMCNC: 32.8 GM/DL — SIGNIFICANT CHANGE UP (ref 32–36)
MCV RBC AUTO: 87.2 FL — SIGNIFICANT CHANGE UP (ref 80–100)
NRBC # BLD: 0 /100 WBCS — SIGNIFICANT CHANGE UP (ref 0–0)
PHOSPHATE SERPL-MCNC: 3.3 MG/DL — SIGNIFICANT CHANGE UP (ref 2.5–4.5)
PLATELET # BLD AUTO: 229 K/UL — SIGNIFICANT CHANGE UP (ref 150–400)
POTASSIUM SERPL-MCNC: 4.4 MMOL/L — SIGNIFICANT CHANGE UP (ref 3.5–5.3)
POTASSIUM SERPL-SCNC: 4.4 MMOL/L — SIGNIFICANT CHANGE UP (ref 3.5–5.3)
PROT SERPL-MCNC: 7.3 GM/DL — SIGNIFICANT CHANGE UP (ref 6–8.3)
RBC # BLD: 5.53 M/UL — SIGNIFICANT CHANGE UP (ref 4.2–5.8)
RBC # FLD: 12.8 % — SIGNIFICANT CHANGE UP (ref 10.3–14.5)
SODIUM SERPL-SCNC: 136 MMOL/L — SIGNIFICANT CHANGE UP (ref 135–145)
WBC # BLD: 5.77 K/UL — SIGNIFICANT CHANGE UP (ref 3.8–10.5)
WBC # FLD AUTO: 5.77 K/UL — SIGNIFICANT CHANGE UP (ref 3.8–10.5)

## 2021-03-23 PROCEDURE — 99233 SBSQ HOSP IP/OBS HIGH 50: CPT

## 2021-03-23 RX ADMIN — REMDESIVIR 500 MILLIGRAM(S): 5 INJECTION INTRAVENOUS at 12:01

## 2021-03-23 RX ADMIN — SIMETHICONE 80 MILLIGRAM(S): 80 TABLET, CHEWABLE ORAL at 12:05

## 2021-03-23 RX ADMIN — Medication 6 MILLIGRAM(S): at 05:23

## 2021-03-23 RX ADMIN — Medication 25 MILLIGRAM(S): at 05:23

## 2021-03-23 RX ADMIN — ENOXAPARIN SODIUM 40 MILLIGRAM(S): 100 INJECTION SUBCUTANEOUS at 12:01

## 2021-03-23 RX ADMIN — Medication 25 MILLIGRAM(S): at 13:13

## 2021-03-23 NOTE — PROGRESS NOTE ADULT - PROBLEM SELECTOR PLAN 1
c/w dexamethasone 6 mg po x 10 days  c/w remdesivir 100 mg  x 4 days  d dimer 243, procal 0.15, C reactive protein 2.44 , ferritin 1206  lovenox for DVT ppx  continue with supplemental oxygen prn NC  CTA chest negative for PE       Start IV Fluids 1/2 NS@80ml/hr for 24 hrs, then re-evaluate.  Order JAQUELINE lower extremity Doppler to r/o DVT.  Consider X-Ray leg in am if PMD agrees.  Tylenol 650mg po q6hrs PRN for fever/pain.  Send D-Dimer, Protien C, Protien S,   GI Prophylaxis with Protonix 40mg po QDaily.  DVT Prophylaxis on Lovenox
c/w dexamethasone 6 mg po x 10 days  c/w remdesivir 100 mg  x 4 days  d dimer 243, procal 0.15, C reactive protein 2.44 , ferritin 1206  lovenox for DVT ppx  continue with supplemental oxygen prn NC  CTA chest negative for PE but GGO      Start IV Fluids 1/2 NS@80ml/hr for 24 hrs, then re-evaluate.  Order JAQUELINE lower extremity Doppler to r/o DVT.  Consider X-Ray leg in am if PMD agrees.  Tylenol 650mg po q6hrs PRN for fever/pain.  Send D-Dimer, Protien C, Protien S,   GI Prophylaxis with Protonix 40mg po QDaily.  DVT Prophylaxis on Lovenox

## 2021-03-23 NOTE — PROGRESS NOTE ADULT - ASSESSMENT
40 yr old male with PMH of unprovoked PE previously on lovenox and TB s/p treatment 2008 presented to [facility] 3/21 with SOB and recent diagnosis of Covid 19 . Patient was admitted for acute respiratory failure. Patient was transferred to [facility].
40 yr old male with PMH of unprovoked PE previously on lovenox and TB s/p treatment 2008 presented to [facility] 3/21 with SOB and recent diagnosis of Covid 19 . Patient was admitted for acute respiratory failure. Patient was transferred to [facility].

## 2021-03-23 NOTE — PROGRESS NOTE ADULT - SUBJECTIVE AND OBJECTIVE BOX
HPI:  Patient was a transfer from [facility] who received one dose of Remdesivir and PO decadron .    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 The patient reports sick contact at place of employment and developed symptoms last Thursday with chills, fever, muscle ache, dry cough, sob, chest discomfort with deep breath and completed COVID testing which returned positive. He presents on day of admit due to worsening sob at rest and history of PE. He denies hemoptysis, malignancy, prolonged immobility and lower extremity swelling.    In the ED, .2, 129/81, 104, 16 93%RA-> 20 95% on 2LNC  s/p dexamethasone 6mgIV (17:49) acetaminophen   (22 Mar 2021 02:17)      Patient is a 40y old  Male who presents with a chief complaint of sob and recent covid 19 diagnosis (22 Mar 2021 19:20)      INTERVAL HPI/OVERNIGHT EVENTS: no acute events overnight feels a little better     MEDICATIONS  (STANDING):  dexAMETHasone     Tablet 6 milliGRAM(s) Oral daily  enoxaparin Injectable 40 milliGRAM(s) SubCutaneous daily  remdesivir  IVPB 100 milliGRAM(s) IV Intermittent every 24 hours  simethicone 80 milliGRAM(s) Chew daily    MEDICATIONS  (PRN):  acetaminophen   Tablet .. 650 milliGRAM(s) Oral every 6 hours PRN Temp greater or equal to 38.5C (101.3F)      Allergies    No Known Allergies    Intolerances        REVIEW OF SYSTEMS:  CONSTITUTIONAL: No fever, weight loss, or fatigue  EYES: No eye pain, visual disturbances, or discharge  ENMT:  No difficulty hearing, tinnitus, vertigo; No sinus or throat pain  NECK: No pain or stiffness  BREASTS: No pain, masses, or nipple discharge  RESPIRATORY:  cough, shortness of breath  CARDIOVASCULAR: No chest pain, palpitations, dizziness, or leg swelling  GASTROINTESTINAL: No abdominal or epigastric pain. No nausea, vomiting, or hematemesis; No diarrhea or constipation. No melena or hematochezia.  GENITOURINARY: No dysuria, frequency, hematuria, or incontinence  NEUROLOGICAL: No headaches, memory loss, loss of strength, numbness, or tremors  SKIN: No itching, burning, rashes, or lesions   LYMPH NODES: No enlarged glands  ENDOCRINE: No heat or cold intolerance; No hair loss  MUSCULOSKELETAL: No joint pain or swelling; No muscle, back, or extremity pain  PSYCHIATRIC: No depression, anxiety, mood swings, or difficulty sleeping  HEME/LYMPH: No easy bruising, or bleeding gums  ALLERGY AND IMMUNOLOGIC: No hives or eczema    Vital Signs Last 24 Hrs  T(C): 36.7 (23 Mar 2021 17:00), Max: 36.8 (22 Mar 2021 23:28)  T(F): 98.1 (23 Mar 2021 17:00), Max: 98.2 (22 Mar 2021 23:28)  HR: 71 (23 Mar 2021 17:00) (62 - 91)  BP: 115/67 (23 Mar 2021 17:00) (104/71 - 120/75)  RR: 17 (23 Mar 2021 17:00) (17 - 18)  SpO2: 98% (23 Mar 2021 17:00) (95% - 98%)    PHYSICAL EXAM:  GENERAL: NAD, well-groomed, well-developed  HEAD:  Atraumatic, Normocephalic  EYES: EOMI, PERRLA, conjunctiva and sclera clear  ENMT: No tonsillar erythema, exudates, or enlargement; Moist mucous membranes, Good dentition, No lesions  NECK: Supple, No JVD, Normal thyroid  NERVOUS SYSTEM:  Alert & Oriented X3, Good concentration; Motor Strength 5/5 B/L upper and lower extremities; DTRs 2+ intact and symmetric  CHEST/LUNG: Clear to percussion bilaterally; No rales, rhonchi, wheezing, or rubs  HEART: Regular rate and rhythm; No murmurs, rubs, or gallops  ABDOMEN: Soft, Nontender, Nondistended; Bowel sounds present  EXTREMITIES:  2+ Peripheral Pulses, No clubbing, cyanosis, or edema  LYMPH: No lymphadenopathy noted  SKIN: No rashes or lesions    LABS:                        15.8   5.77  )-----------( 229      ( 23 Mar 2021 07:00 )             48.2     03-23    136  |  101  |  19  ----------------------------<  94  4.4   |  30  |  1.09    Ca    8.9      23 Mar 2021 07:00  Phos  3.3     03-23  Mg     2.7     03-23    TPro  7.3  /  Alb  2.7<L>  /  TBili  0.7  /  DBili  x   /  AST  58<H>  /  ALT  115<H>  /  AlkPhos  51  03-23        CAPILLARY BLOOD GLUCOSE          RADIOLOGY & ADDITIONAL TESTS:  < from: CT Angio Chest w/ IV Cont (03.20.21 @ 23:43) >  EXAM:  CT ANGIO CHEST (W)AW IC                            PROCEDURE DATE:  03/20/2021            INTERPRETATION:  CLINICAL INFORMATION: Shortness of breath and tachycardia.    COMPARISON: None.    CONTRAST/COMPLICATIONS:  IV Contrast: Omnipaque 350 77 cc administered   23 cc discarded  Oral Contrast: None  Complications: None reported at time of study completion    PROCEDURE:  CT Angiography of the Chest.  Sagittal and coronal reformats were performed as well as 3D (MIP) reconstructions.    FINDINGS:    LUNGS AND AIRWAYS: Patent central airways. Scattered patchy groundglass airspace opacities in the right upper, middle and lower lobes. Multiple cysts scattered throughout the lungs largest in the right middle lobe measuring up to 7.8 x 6.6 cm. Partial atelectasis of bilateral lower lobes. Left basilar subsegmental atelectasis.  PLEURA: No pleural effusion.  MEDIASTINUM AND JIMENEZ: No lymphadenopathy.  VESSELS: Limited evaluation of the subsegmental pulmonary arteries in the lung bases secondary to motion artifact. No pulmonary embolism in the main, lobar or segmental pulmonary arteries and upper lobe subsegmental pulmonary arteries.  HEART: Heart size is normal. No pericardial effusion.  CHEST WALL AND LOWER NECK: Within normal limits.  VISUALIZED UPPER ABDOMEN: Within normal limits.  BONES: Within normal limits.    IMPRESSION:  1. Limited evaluation of the subsegmental pulmonary arteries in the lung bases secondary to motion artifact. No pulmonary embolism in the main, lobar or segmental pulmonary arteries and upper lobe subsegmental pulmonary arteries.  2. Scattered patchy groundglass airspace opacities in the right upper, middle and lower lobes likely infectious in etiology.    < end of copied text >    Imaging Personally Reviewed:  [ X] YES  [ ] NO    Consultant(s) Notes Reviewed:  [X ] YES  [ ] NO    Care Discussed with Consultants/Other Providers [ X] YES  [ ] NO
HPI:  Patient was a transfer from [facility] who received one dose of Remdesivir and PO decadron .  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 The patient reports sick contact at place of employment and developed symptoms last Thursday with chills, fever, muscle ache, dry cough, sob, chest discomfort with deep breath and completed COVID testing which returned positive. He presents on day of admit due to worsening sob at rest and history of PE. He denies hemoptysis, malignancy, prolonged immobility and lower extremity swelling.    In the ED, .2, 129/81, 104, 16 93%RA-> 20 95% on 2LNC  s/p dexamethasone 6mgIV (17:49) acetaminophen   (22 Mar 2021 02:17)  Patient is a 40y old  Male who presents with a chief complaint of sob and recent covid 19 diagnosis (22 Mar 2021 02:17)      INTERVAL HPI/OVERNIGHT EVENTS:    MEDICATIONS  (STANDING):  dexAMETHasone     Tablet 6 milliGRAM(s) Oral daily  enoxaparin Injectable 40 milliGRAM(s) SubCutaneous daily  remdesivir  IVPB 100 milliGRAM(s) IV Intermittent every 24 hours  simethicone 80 milliGRAM(s) Chew daily    MEDICATIONS  (PRN):  acetaminophen   Tablet .. 650 milliGRAM(s) Oral every 6 hours PRN Temp greater or equal to 38.5C (101.3F)      Allergies    No Known Allergies    Intolerances        REVIEW OF SYSTEMS:  CONSTITUTIONAL: No fever, weight loss, or fatigue  EYES: No eye pain, visual disturbances, or discharge  ENMT:  No difficulty hearing, tinnitus, vertigo; No sinus or throat pain  NECK: No pain or stiffness  RESPIRATORY:  cough,  shortness of breath  CARDIOVASCULAR: No chest pain, palpitations, dizziness, or leg swelling  GASTROINTESTINAL:hicupps  GENITOURINARY: No dysuria, frequency, hematuria, or incontinence  NEUROLOGICAL: No headaches, memory loss, loss of strength, numbness, or tremors  SKIN: No itching, burning, rashes, or lesions   LYMPH NODES: No enlarged glands  ENDOCRINE: No heat or cold intolerance; No hair loss  MUSCULOSKELETAL: No joint pain or swelling; No muscle, back, or extremity pain  PSYCHIATRIC: No depression, anxiety, mood swings, or difficulty sleeping  HEME/LYMPH: No easy bruising, or bleeding gums  ALLERGY AND IMMUNOLOGIC: No hives or eczema    Vital Signs Last 24 Hrs  T(C): 36.7 (22 Mar 2021 17:31), Max: 37.1 (21 Mar 2021 21:20)  T(F): 98.1 (22 Mar 2021 17:31), Max: 98.7 (21 Mar 2021 21:20)  HR: 70 (22 Mar 2021 17:31) (70 - 76)  RR: 18 (22 Mar 2021 17:31) (18 - 20)  SpO2: 89% (22 Mar 2021 18:09) (89% - 98%)    PHYSICAL EXAM:  GENERAL: NAD, well-groomed, well-developed  HEAD:  Atraumatic, Normocephalic  EYES: EOMI, PERRLA, conjunctiva and sclera clear  ENMT: No tonsillar erythema, exudates, or enlargement; Moist mucous membranes, Good dentition, No lesions  NECK: Supple, No JVD, Normal thyroid  NERVOUS SYSTEM:  Alert & Oriented X3, Good concentration; Motor Strength 5/5 B/L upper and lower extremities; DTRs 2+ intact and symmetric  CHEST/LUNG: Clear to percussion bilaterally; No rales, rhonchi, wheezing, or rubs  HEART: Regular rate and rhythm; No murmurs, rubs, or gallops  ABDOMEN: Soft, Nontender, Nondistended; Bowel sounds present  EXTREMITIES:  2+ Peripheral Pulses, No clubbing, cyanosis, or edema  LYMPH: No lymphadenopathy noted  SKIN: No rashes or lesions    LABS:                        15.9   5.88  )-----------( 242      ( 22 Mar 2021 08:42 )             47.9     03-22    134<L>  |  98  |  21  ----------------------------<  111<H>  3.8   |  29  |  1.06    Ca    8.7      22 Mar 2021 08:42  Phos  4.1     03-21  Mg     2.6     03-21    TPro  7.5  /  Alb  2.6<L>  /  TBili  0.7  /  DBili  x   /  AST  72<H>  /  ALT  116<H>  /  AlkPhos  56  03-22    PTT - ( 21 Mar 2021 08:37 )  PTT:34.0 sec    CAPILLARY BLOOD GLUCOSE          RADIOLOGY & ADDITIONAL TESTS:    Imaging Personally Reviewed:  [X] YES  [ ] NO    Consultant(s) Notes Reviewed:  [X ] YES  [ ] NO    Care Discussed with Consultants/Other Providers [ X] YES  [ ] NO

## 2021-03-24 VITALS
OXYGEN SATURATION: 97 % | TEMPERATURE: 98 F | DIASTOLIC BLOOD PRESSURE: 72 MMHG | RESPIRATION RATE: 17 BRPM | HEART RATE: 86 BPM | SYSTOLIC BLOOD PRESSURE: 119 MMHG

## 2021-03-24 LAB
COVID-19 SPIKE DOMAIN AB INTERP: POSITIVE
COVID-19 SPIKE DOMAIN AB INTERP: POSITIVE
COVID-19 SPIKE DOMAIN ANTIBODY RESULT: 16.1 AU/ML — HIGH
COVID-19 SPIKE DOMAIN ANTIBODY RESULT: 27 U/ML — HIGH
SARS-COV-2 IGG+IGM SERPL QL IA: 16.1 AU/ML — HIGH
SARS-COV-2 IGG+IGM SERPL QL IA: 27 U/ML — HIGH
SARS-COV-2 IGG+IGM SERPL QL IA: POSITIVE
SARS-COV-2 IGG+IGM SERPL QL IA: POSITIVE

## 2021-03-24 PROCEDURE — 99239 HOSP IP/OBS DSCHRG MGMT >30: CPT

## 2021-03-24 RX ORDER — ENOXAPARIN SODIUM 100 MG/ML
40 INJECTION SUBCUTANEOUS
Qty: 0 | Refills: 0 | DISCHARGE

## 2021-03-24 RX ADMIN — REMDESIVIR 500 MILLIGRAM(S): 5 INJECTION INTRAVENOUS at 11:35

## 2021-03-24 RX ADMIN — ENOXAPARIN SODIUM 40 MILLIGRAM(S): 100 INJECTION SUBCUTANEOUS at 11:35

## 2021-03-24 RX ADMIN — Medication 6 MILLIGRAM(S): at 05:50

## 2021-03-24 NOTE — DISCHARGE NOTE PROVIDER - NSDCCPCAREPLAN_GEN_ALL_CORE_FT
PRINCIPAL DISCHARGE DIAGNOSIS  Diagnosis: COVID-19  Assessment and Plan of Treatment: improved follow with your primary doctor

## 2021-03-24 NOTE — DISCHARGE NOTE NURSING/CASE MANAGEMENT/SOCIAL WORK - PATIENT PORTAL LINK FT
You can access the FollowMyHealth Patient Portal offered by [facility] by registering at the following website: [email or web address]. By joining [facility]’s FollowMyHealth portal, you will also be able to view your health information using other applications (apps) compatible with our system.

## 2021-03-24 NOTE — CHART NOTE - NSCHARTNOTEFT_GEN_A_CORE
To whom it may concern:     Carlos Aviles  JAM    1980   has been under inpatient care at [facility] since    3/21/2021  .  He was discharged from the hospital   3/24/2021  and may return to work on 3/29/2021.      If there are any questions please do not hesitate to call    Prosper Menendez MD  [phone]

## 2021-03-24 NOTE — DISCHARGE NOTE PROVIDER - HOSPITAL COURSE
40 yr old male with PMH of unprovoked PE previously on lovenox and TB s/p treatment 2008 presented to [facility] 3/21 with SOB and recent diagnosis of Covid 19 . Patient was admitted for acute respiratory failure. Patient was transferred to [facility].    Problem/Plan - 1:  ·  Problem: Acute respiratory failure, unspecified whether with hypoxia or hypercapnia.  Plan: improved, 97% on RA   d dimer 243, procal 0.15, C reactive protein 2.44 , ferritin 1206    CTA chest negative for PE           Problem/Plan - 2:  ·  Problem: Transaminitis.  Plan: likely from covid 19   stable can follow as outpt    45min spent on dc plan

## 2021-03-24 NOTE — DISCHARGE NOTE PROVIDER - NSDCQMSTAIRS_GEN_ALL_CORE
Pt was given a call regarding her lab results. Pt verify it was her. Pt was positive for Trichomonas. Pt was inform that she was treated on the day of her visit. Pt was advised to inform her partner about the lab results coming back positive. Pt had no further questions nor concerns.    
No

## 2021-04-01 DIAGNOSIS — R74.01 ELEVATION OF LEVELS OF LIVER TRANSAMINASE LEVELS: ICD-10-CM

## 2021-04-01 DIAGNOSIS — J96.01 ACUTE RESPIRATORY FAILURE WITH HYPOXIA: ICD-10-CM

## 2021-04-01 DIAGNOSIS — U07.1 COVID-19: ICD-10-CM

## 2021-04-01 DIAGNOSIS — Z86.711 PERSONAL HISTORY OF PULMONARY EMBOLISM: ICD-10-CM

## 2021-04-01 DIAGNOSIS — Z87.891 PERSONAL HISTORY OF NICOTINE DEPENDENCE: ICD-10-CM

## 2021-04-03 ENCOUNTER — INPATIENT (INPATIENT)
Facility: HOSPITAL | Age: 41
LOS: 5 days | Discharge: HOME HEALTH | End: 2021-04-09
Attending: INTERNAL MEDICINE | Admitting: INTERNAL MEDICINE
Payer: COMMERCIAL

## 2021-04-03 VITALS
SYSTOLIC BLOOD PRESSURE: 135 MMHG | OXYGEN SATURATION: 96 % | DIASTOLIC BLOOD PRESSURE: 71 MMHG | HEART RATE: 112 BPM | TEMPERATURE: 99 F | WEIGHT: 184.09 LBS | RESPIRATION RATE: 18 BRPM | HEIGHT: 71 IN

## 2021-04-03 PROBLEM — I26.99 OTHER PULMONARY EMBOLISM WITHOUT ACUTE COR PULMONALE: Chronic | Status: ACTIVE | Noted: 2021-03-20

## 2021-04-03 PROBLEM — A15.9 RESPIRATORY TUBERCULOSIS UNSPECIFIED: Chronic | Status: ACTIVE | Noted: 2021-03-21

## 2021-04-03 LAB
ALBUMIN SERPL ELPH-MCNC: 2.8 G/DL — LOW (ref 3.3–5)
ALP SERPL-CCNC: 47 U/L — SIGNIFICANT CHANGE UP (ref 40–120)
ALT FLD-CCNC: 41 U/L — SIGNIFICANT CHANGE UP (ref 12–78)
ANION GAP SERPL CALC-SCNC: 5 MMOL/L — SIGNIFICANT CHANGE UP (ref 5–17)
APTT BLD: 34.1 SEC — SIGNIFICANT CHANGE UP (ref 27.5–35.5)
AST SERPL-CCNC: 27 U/L — SIGNIFICANT CHANGE UP (ref 15–37)
BASE EXCESS BLDA CALC-SCNC: 3.8 MMOL/L — HIGH (ref -2–2)
BASOPHILS # BLD AUTO: 0.02 K/UL — SIGNIFICANT CHANGE UP (ref 0–0.2)
BASOPHILS NFR BLD AUTO: 0.3 % — SIGNIFICANT CHANGE UP (ref 0–2)
BILIRUB SERPL-MCNC: 0.6 MG/DL — SIGNIFICANT CHANGE UP (ref 0.2–1.2)
BLOOD GAS COMMENTS: SIGNIFICANT CHANGE UP
BLOOD GAS COMMENTS: SIGNIFICANT CHANGE UP
BLOOD GAS SOURCE: SIGNIFICANT CHANGE UP
BUN SERPL-MCNC: 12 MG/DL — SIGNIFICANT CHANGE UP (ref 7–23)
CALCIUM SERPL-MCNC: 8.7 MG/DL — SIGNIFICANT CHANGE UP (ref 8.5–10.1)
CHLORIDE SERPL-SCNC: 100 MMOL/L — SIGNIFICANT CHANGE UP (ref 96–108)
CO2 SERPL-SCNC: 31 MMOL/L — SIGNIFICANT CHANGE UP (ref 22–31)
CREAT SERPL-MCNC: 1.16 MG/DL — SIGNIFICANT CHANGE UP (ref 0.5–1.3)
D DIMER BLD IA.RAPID-MCNC: 464 NG/ML DDU — HIGH
EOSINOPHIL # BLD AUTO: 0.1 K/UL — SIGNIFICANT CHANGE UP (ref 0–0.5)
EOSINOPHIL NFR BLD AUTO: 1.5 % — SIGNIFICANT CHANGE UP (ref 0–6)
FLUAV AG NPH QL: SIGNIFICANT CHANGE UP
FLUBV AG NPH QL: SIGNIFICANT CHANGE UP
GLUCOSE SERPL-MCNC: 83 MG/DL — SIGNIFICANT CHANGE UP (ref 70–99)
HCO3 BLDA-SCNC: 27 MMOL/L — SIGNIFICANT CHANGE UP (ref 21–29)
HCT VFR BLD CALC: 49 % — SIGNIFICANT CHANGE UP (ref 39–50)
HGB BLD-MCNC: 16.2 G/DL — SIGNIFICANT CHANGE UP (ref 13–17)
HOROWITZ INDEX BLDA+IHG-RTO: 21 — SIGNIFICANT CHANGE UP
IMM GRANULOCYTES NFR BLD AUTO: 0.1 % — SIGNIFICANT CHANGE UP (ref 0–1.5)
INR BLD: 1.33 RATIO — HIGH (ref 0.88–1.16)
LYMPHOCYTES # BLD AUTO: 2.45 K/UL — SIGNIFICANT CHANGE UP (ref 1–3.3)
LYMPHOCYTES # BLD AUTO: 35.7 % — SIGNIFICANT CHANGE UP (ref 13–44)
MAGNESIUM SERPL-MCNC: 1.9 MG/DL — SIGNIFICANT CHANGE UP (ref 1.6–2.6)
MCHC RBC-ENTMCNC: 28.9 PG — SIGNIFICANT CHANGE UP (ref 27–34)
MCHC RBC-ENTMCNC: 33.1 GM/DL — SIGNIFICANT CHANGE UP (ref 32–36)
MCV RBC AUTO: 87.3 FL — SIGNIFICANT CHANGE UP (ref 80–100)
MONOCYTES # BLD AUTO: 0.63 K/UL — SIGNIFICANT CHANGE UP (ref 0–0.9)
MONOCYTES NFR BLD AUTO: 9.2 % — SIGNIFICANT CHANGE UP (ref 2–14)
NEUTROPHILS # BLD AUTO: 3.65 K/UL — SIGNIFICANT CHANGE UP (ref 1.8–7.4)
NEUTROPHILS NFR BLD AUTO: 53.2 % — SIGNIFICANT CHANGE UP (ref 43–77)
NRBC # BLD: 0 /100 WBCS — SIGNIFICANT CHANGE UP (ref 0–0)
NT-PROBNP SERPL-SCNC: 15 PG/ML — SIGNIFICANT CHANGE UP (ref 0–125)
PCO2 BLDA: 39 MMHG — SIGNIFICANT CHANGE UP (ref 32–46)
PH BLD: 7.46 — HIGH (ref 7.35–7.45)
PLATELET # BLD AUTO: 263 K/UL — SIGNIFICANT CHANGE UP (ref 150–400)
PO2 BLDA: 64 MMHG — LOW (ref 74–108)
POTASSIUM SERPL-MCNC: 4.6 MMOL/L — SIGNIFICANT CHANGE UP (ref 3.5–5.3)
POTASSIUM SERPL-SCNC: 4.6 MMOL/L — SIGNIFICANT CHANGE UP (ref 3.5–5.3)
PROT SERPL-MCNC: 7 GM/DL — SIGNIFICANT CHANGE UP (ref 6–8.3)
PROTHROM AB SERPL-ACNC: 15.2 SEC — HIGH (ref 10.6–13.6)
RBC # BLD: 5.61 M/UL — SIGNIFICANT CHANGE UP (ref 4.2–5.8)
RBC # FLD: 13.1 % — SIGNIFICANT CHANGE UP (ref 10.3–14.5)
SAO2 % BLDA: 92 % — SIGNIFICANT CHANGE UP (ref 92–96)
SARS-COV-2 RNA SPEC QL NAA+PROBE: SIGNIFICANT CHANGE UP
SODIUM SERPL-SCNC: 136 MMOL/L — SIGNIFICANT CHANGE UP (ref 135–145)
TROPONIN I SERPL-MCNC: <.015 NG/ML — SIGNIFICANT CHANGE UP (ref 0.01–0.04)
WBC # BLD: 6.86 K/UL — SIGNIFICANT CHANGE UP (ref 3.8–10.5)
WBC # FLD AUTO: 6.86 K/UL — SIGNIFICANT CHANGE UP (ref 3.8–10.5)

## 2021-04-03 PROCEDURE — 99223 1ST HOSP IP/OBS HIGH 75: CPT

## 2021-04-03 PROCEDURE — 93010 ELECTROCARDIOGRAM REPORT: CPT

## 2021-04-03 PROCEDURE — 71275 CT ANGIOGRAPHY CHEST: CPT | Mod: 26,MA

## 2021-04-03 PROCEDURE — 99291 CRITICAL CARE FIRST HOUR: CPT | Mod: 25

## 2021-04-03 PROCEDURE — 71045 X-RAY EXAM CHEST 1 VIEW: CPT | Mod: 26

## 2021-04-03 PROCEDURE — 32551 INSERTION OF CHEST TUBE: CPT

## 2021-04-03 RX ORDER — ENOXAPARIN SODIUM 100 MG/ML
40 INJECTION SUBCUTANEOUS DAILY
Refills: 0 | Status: DISCONTINUED | OUTPATIENT
Start: 2021-04-03 | End: 2021-04-04

## 2021-04-03 RX ORDER — BUDESONIDE AND FORMOTEROL FUMARATE DIHYDRATE 160; 4.5 UG/1; UG/1
2 AEROSOL RESPIRATORY (INHALATION)
Refills: 0 | Status: DISCONTINUED | OUTPATIENT
Start: 2021-04-03 | End: 2021-04-09

## 2021-04-03 RX ORDER — IPRATROPIUM/ALBUTEROL SULFATE 18-103MCG
3 AEROSOL WITH ADAPTER (GRAM) INHALATION EVERY 6 HOURS
Refills: 0 | Status: DISCONTINUED | OUTPATIENT
Start: 2021-04-03 | End: 2021-04-04

## 2021-04-03 RX ORDER — SODIUM CHLORIDE 9 MG/ML
2000 INJECTION INTRAMUSCULAR; INTRAVENOUS; SUBCUTANEOUS ONCE
Refills: 0 | Status: DISCONTINUED | OUTPATIENT
Start: 2021-04-03 | End: 2021-04-03

## 2021-04-03 RX ORDER — CEFAZOLIN SODIUM 1 G
1000 VIAL (EA) INJECTION EVERY 8 HOURS
Refills: 0 | Status: COMPLETED | OUTPATIENT
Start: 2021-04-03 | End: 2021-04-04

## 2021-04-03 RX ORDER — FENTANYL CITRATE 50 UG/ML
100 INJECTION INTRAVENOUS ONCE
Refills: 0 | Status: DISCONTINUED | OUTPATIENT
Start: 2021-04-03 | End: 2021-04-03

## 2021-04-03 RX ORDER — SODIUM CHLORIDE 9 MG/ML
2000 INJECTION, SOLUTION INTRAVENOUS ONCE
Refills: 0 | Status: COMPLETED | OUTPATIENT
Start: 2021-04-03 | End: 2021-04-03

## 2021-04-03 RX ADMIN — FENTANYL CITRATE 100 MICROGRAM(S): 50 INJECTION INTRAVENOUS at 17:30

## 2021-04-03 RX ADMIN — FENTANYL CITRATE 100 MICROGRAM(S): 50 INJECTION INTRAVENOUS at 18:30

## 2021-04-03 RX ADMIN — Medication 100 MILLIGRAM(S): at 18:47

## 2021-04-03 RX ADMIN — SODIUM CHLORIDE 2000 MILLILITER(S): 9 INJECTION, SOLUTION INTRAVENOUS at 17:49

## 2021-04-03 NOTE — ED PROCEDURE NOTE - CPROC ED CHEST TUBE DETAIL1
See scanned ICD report in chart. Chargeable visit.
An incision into the lateral chest was made (see location above). A thoracostomy tube was placed into the pleural space (see size above), and connected to a closed drainage canister and water suction. The tube was secured with 00 nylon suture, and the area dressed with petrolatum impregnated gauze bandage. A post procedure chest x-ray was ordered, and proper placement was confirmed.

## 2021-04-03 NOTE — ED ADULT TRIAGE NOTE - CHIEF COMPLAINT QUOTE
pt s/p COVID states negative PCR 3 days ago, SOB +Productive  cough, was recently hospitalized here, hx of bilateral PE, hx of pulmonary TB with lung scarring

## 2021-04-03 NOTE — ED ADULT NURSE NOTE - OBJECTIVE STATEMENT
Received patient in bed A. AOX4 witnessed ambulating with steady gait. 40M w/ h/o unprovoked PE in the past, treated with coumadin which is now MO'ed, presents w/ 9 days of SOB, fevers, chills, chest pressure, body aches. Denies recent immobilization, surgeries. States his shortness of breath is worsening. Denies lightheadedness. pt s/p COVID states negative PCR 3 days ago, SOB +Productive  cough, was recently hospitalized here, hx of bilateral PE, hx of pulmonary TB with lung scarring. cardiac monitor placed. Awaiting to be seen

## 2021-04-03 NOTE — H&P ADULT - HISTORY OF PRESENT ILLNESS
41 y/o Male PMH PE, TB presents with c/o trouble breathing. Patient states he was previously hospitalized x 3 weeks after COVID+ diagnosis. Patient  states he'd had trouble breathing during hospitalization but upon discharge he noticed his difficulty breathing. Pt came in today after avoiding to come in, due to his primary physician demanding he come in for evaluation. CTA confirmed large right PTX, no PE.  41 y/o Male PMH prior PE, prior TB presents with c/o trouble breathing.  Patient states he was previously hospitalized x 3 weeks ago for COVID+ diagnosis. Patient  states he'd had trouble breathing during hospitalization, was discharged home about 10 days ago.     Pt came in today after his primary physician demanding he come in for evaluation. CTA confirmed large right PTX, no PE.

## 2021-04-03 NOTE — CONSULT NOTE ADULT - SUBJECTIVE AND OBJECTIVE BOX
Emergency Room Admission    38 year old male with Covid 19 PNA    Tension Rt Pneumothorax    Successful placement of rt pigtail by ED     CXR shows full lung expansion    Plan    Rt CT to suction  Serial CXR  FU serial markers

## 2021-04-03 NOTE — ED ADULT NURSE REASSESSMENT NOTE - NS ED NURSE REASSESS COMMENT FT1
Received report form ARIS Curiel. pt on the bed alert and oriented. pt identified self introduced. pt on oxygen using non rebreather, with closed drainage chest tube inserted in the right side of the chest, connected to wall suction. pt for admission.

## 2021-04-03 NOTE — H&P ADULT - NSHPLABSRESULTS_GEN_ALL_CORE
LABS:                        16.2   6.86  )-----------( 263      ( 03 Apr 2021 16:12 )             49.0     04-03    136  |  100  |  12  ----------------------------<  83  4.6   |  31  |  1.16    Ca    8.7      03 Apr 2021 17:06  Mg     1.9     04-03    TPro  7.0  /  Alb  2.8<L>  /  TBili  0.6  /  DBili  x   /  AST  27  /  ALT  41  /  AlkPhos  47  04-03    PT/INR - ( 03 Apr 2021 16:12 )   PT: 15.2 sec;   INR: 1.33 ratio         PTT - ( 03 Apr 2021 16:12 )  PTT:34.1 sec        RADIOLOGY & ADDITIONAL TESTS:

## 2021-04-03 NOTE — ED PROVIDER NOTE - NS ED ROS FT
CONST: no fevers, no chills, no trauma  EYES: no pain, no visual disturbances  ENT: no sore throat, no epistaxis, no rhinorrhea, no hearing changes  CV: no chest pain, no palpitations, no orthopnea, no extremity pain or swelling  RESP: (+) shortness of breath, no cough, no sputum, no pleurisy, no wheezing  ABD: no abdominal pain, no nausea, no vomiting, no diarrhea, no black or bloody stool  : no dysuria, no hematuria, no frequency, no urgency  MSK: no back pain, no neck pain, no extremity pain  NEURO: no headache, no sensory disturbances, no focal weakness, no dizziness  HEME: no easy bleeding or bruising  SKIN: no diaphoresis, no rash

## 2021-04-03 NOTE — ED PROVIDER NOTE - PHYSICAL EXAMINATION
Gen: Alert, NAD  Head: NC, AT   Eyes: PERRL, EOMI, normal lids/conjunctiva  ENT: normal hearing, patent oropharynx without erythema/exudate, uvula midline  Neck: supple, no tenderness, Trachea midline  Pulm: Diminished breath sounds on the right lower lobe, Tachypneic no wheeze/stridor/retractions  CV: RRR, no M/R/G, 2+ radial and dp pulses bl, no edema  Abd: soft, NT/ND, +BS, no hepatosplenomegaly  Mskel: extremities x4 with normal ROM and no joint effusions. no ctl spine ttp.   Skin: no rash, no bruising   Neuro: AAOx3, no sensory/motor deficits, CN 2-12 intact

## 2021-04-03 NOTE — ED PROVIDER NOTE - OBJECTIVE STATEMENT
39 y/o M PMH PE, TB presents with c/o trouble breathing. pt states he was previously hospitalized x3 weeks after COVID+ diagnosis. pt states he'd had trouble breathing during hospitalization but upon dc is when he noticed his difficulty breathing. Pt came in today after avoiding to come in, due to his primary physician demanding he come in for evaluation.

## 2021-04-03 NOTE — H&P ADULT - ASSESSMENT
39 y/o Male PMH PE, TB presents with c/o trouble breathing. Patient states he was previously hospitalized x 3 weeks after COVID+ diagnosis. Patient  states he'd had trouble breathing during hospitalization but upon discharge he noticed his difficulty breathing. Pt came in today after avoiding to come in, due to his primary physician demanding he come in for evaluation. CTA confirmed large right PTX, no PE.     Pulmonary:  39 y/o Male PMH PE, TB presents with c/o trouble breathing. Patient states he was previously hospitalized x 3 weeks after COVID+ diagnosis. Patient  states he'd had trouble breathing during hospitalization but upon discharge he noticed his difficulty breathing. Pt came in today after avoiding to come in, due to his primary physician demanding he come in for evaluation. CTA confirmed large right PTX, no PE.     Pulmonary:  Acute right sided PTX with recent COVID pneumonia. ER placed chest tube today. No PE on CTA. Likely has undiagnosed COPD as bullae and emphysema seen on CTA. Smokes cigarettes and other substances for over 20 years. COVID negative in ER.     Will add 100 % NRB, symbicort bid. Chest tube in place. Discussed case with surgery PA, will follow daily for chest tube management.    41 y/o Male PMH PE, TB presents with c/o trouble breathing. Patient states he was previously hospitalized x 3 weeks after COVID+ diagnosis. Patient  states he'd had trouble breathing during hospitalization but upon discharge he noticed his difficulty breathing. Pt came in today after avoiding to come in, due to his primary physician demanding he come in for evaluation. CTA confirmed large right PTX, no PE.     Pulmonary:  Acute right sided PTX with recent COVID pneumonia. ER placed chest tube today. No PE on CTA. Likely has undiagnosed COPD as bullae and emphysema seen on CTA. Smokes cigarettes and other substances for over 20 years. COVID negative in ER.     Will add 100 % NRB, symbicort bid and duonebs. Chest tube in place. Discussed case with surgery PA, will follow daily for chest tube management.

## 2021-04-03 NOTE — H&P ADULT - NSHPPHYSICALEXAM_GEN_ALL_CORE
PHYSICAL EXAMINATION:  Vital Signs Last 24 Hrs  T(C): 37.2 (03 Apr 2021 18:45), Max: 37.2 (03 Apr 2021 18:45)  T(F): 99 (03 Apr 2021 18:45), Max: 99 (03 Apr 2021 18:45)  HR: 105 (03 Apr 2021 19:17) (86 - 112)  BP: 130/79 (03 Apr 2021 19:17) (115/59 - 143/80)  BP(mean): --  RR: 20 (03 Apr 2021 19:17) (18 - 22)  SpO2: 100% (03 Apr 2021 19:17) (92% - 100%)  CAPILLARY BLOOD GLUCOSE          GENERAL: NAD, well-groomed, well-developed  HEAD:  atraumatic, normocephalic  EYES: sclera anicteric  ENMT: mucous membranes moist  NECK: supple, No JVD  CHEST/LUNG: clear to auscultation bilaterally; no rales, rhonchi, or wheezing b/l  HEART: normal S1, S2  ABDOMEN: BS+, soft, ND, NT   EXTREMITIES:  pulses palpable; no clubbing, cyanosis, or edema b/l LEs  NEURO: awake, alert, interactive; moves all extremities  SKIN: no rashes or lesions PHYSICAL EXAMINATION:  Vital Signs Last 24 Hrs  T(C): 37.2 (03 Apr 2021 18:45), Max: 37.2 (03 Apr 2021 18:45)  T(F): 99 (03 Apr 2021 18:45), Max: 99 (03 Apr 2021 18:45)  HR: 105 (03 Apr 2021 19:17) (86 - 112)  BP: 130/79 (03 Apr 2021 19:17) (115/59 - 143/80)  BP(mean): --  RR: 20 (03 Apr 2021 19:17) (18 - 22)  SpO2: 100% (03 Apr 2021 19:17) (92% - 100%)  CAPILLARY BLOOD GLUCOSE          GENERAL: NAD, well-groomed, well-developed, seen on 1 -D, stable, CT in place, on 100 % NRB  HEAD:  atraumatic, normocephalic  EYES: sclera anicteric  ENMT: mucous membranes moist  NECK: supple, No JVD  CHEST/LUNG: clear to auscultation bilaterally; no rales, rhonchi, or wheezing b/l  HEART: normal S1, S2  ABDOMEN: BS+, soft, ND, NT   EXTREMITIES:  pulses palpable; no clubbing, cyanosis, or edema b/l LEs  NEURO: awake, alert, interactive; moves all extremities  SKIN: no rashes or lesions

## 2021-04-03 NOTE — CHART NOTE - NSCHARTNOTEFT_GEN_A_CORE
ICU CONSULT  =========      HPI:  39 y/o Male PMH prior PE, prior TB presents with c/o trouble breathing.  Patient states he was previously hospitalized x 3 weeks ago discharged on 3/24 for COVID+ diagnosis. Patient  states he'd had trouble breathing during hospitalization, was discharged home about 10 days ago.     Pt came in today after his primary physician demanding he come in for evaluation. CTA confirmed large right PTX, no PE.  (03 Apr 2021 19:22)      Allergies/ Intolerances   No Known Allergies      Home Medications:      SOCIAL HX:     Smoking : [  ] Current daily  [  ] former  X  ] never         ETOH/Illicit drugs: [ X ] denies  [  ] current use :           Occupation:     PAST MEDICAL & SURGICAL HISTORY:  PE (pulmonary thromboembolism)    Tuberculosis    No significant past surgical history    FAMILY HISTORY:  No pertinent family history in first degree relatives    No known cardiovascular or pulmonary family history     ROS:  See HPI     PHYSICAL EXAM    ICU Vital Signs Last 24 Hrs  T(C): 37.1 (03 Apr 2021 19:55), Max: 37.2 (03 Apr 2021 18:45)  T(F): 98.8 (03 Apr 2021 19:55), Max: 99 (03 Apr 2021 18:45)  HR: 101 (03 Apr 2021 19:55) (86 - 112)  BP: 135/75 (03 Apr 2021 19:55) (115/59 - 143/80)  BP(mean): --  ABP: --  ABP(mean): --  RR: 20 (03 Apr 2021 19:55) (18 - 22)  SpO2: 100% (03 Apr 2021 19:55) (92% - 100%)      General: Not in distress  HEENT:  PARISA              Lymphatic system: No LN  Lungs: Bilateral BS, Chest tube in situ   Cardiovascular: Regular  Gastrointestinal: Soft, Positive BS  Musculoskeletal: No clubbing.  Moves all extremities.    Skin: Warm.  Intact  Neurological: No motor or sensory deficit         LABS:                          16.2   6.86  )-----------( 263      ( 03 Apr 2021 16:12 )             49.0      04-03    136  |  100  |  12  ----------------------------<  83  4.6   |  31  |  1.16    Ca    8.7      03 Apr 2021 17:06  Mg     1.9     04-03    TPro  7.0  /  Alb  2.8<L>  /  TBili  0.6  /  DBili  x   /  AST  27  /  ALT  41  /  AlkPhos  47  04-03      PT/INR - ( 03 Apr 2021 16:12 )   PT: 15.2 sec;   INR: 1.33 ratio         PTT - ( 03 Apr 2021 16:12 )  PTT:34.1 sec     LIVER FUNCTIONS - ( 03 Apr 2021 17:06 )  Alb: 2.8 g/dL / Pro: 7.0 gm/dL / ALK PHOS: 47 U/L / ALT: 41 U/L / AST: 27 U/L / GGT: x                                                        CARDIAC MARKERS ( 03 Apr 2021 17:06 )  <.015 ng/mL / x     / x     / x     / x                                                                                                                           COVID-19 PCR: Detected (20 Mar 2021 17:44)    ABG - ( 03 Apr 2021 16:22 )  pH, Arterial: x     pH, Blood: 7.46  /  pCO2: 39    /  pO2: 64    / HCO3: 27    / Base Excess: 3.8   /  SaO2: 92                  CXR:    ECHO:    MEDICATIONS  (STANDING):  albuterol/ipratropium for Nebulization 3 milliLiter(s) Nebulizer every 6 hours  budesonide 160 MICROgram(s)/formoterol 4.5 MICROgram(s) Inhaler 2 Puff(s) Inhalation two times a day  ceFAZolin   IVPB 1000 milliGRAM(s) IV Intermittent every 8 hours  enoxaparin Injectable 40 milliGRAM(s) SubCutaneous daily    MEDICATIONS  (PRN):    ASSESSMENT :   ==============  40 year old gentleman with PMH PE, TB recent covid infection presents with Pneumothorax   S/P needle decompression and CT placement in ED     DISPOSITION: this patient does not currently require critical care . please recall if condition changes     PLAN:  ========    NEURO:  -nuero checks per routine     PULM:  - maintain CT to low continuos suction   - maintain O2 > 92  - thoracic consult by ED     CV:  -Maintain MAP > 65    GI:  -advance diet as tolerated       RENAL:  -maintain urine output > 0.5cc/kg/hr     :  GARCIA yes [  ] NO [ X ] insertion date     ID:  ceFAZolin   IVPB 1000 milliGRAM(s)      ENDO:  -monitor glucose     HEME:  enoxaparin Injectable 40 milliGRAM(s)  -patient may benefit form hematology evaluation with history of PE with no significant cause     MUSC:     Goals of Care:   Advanced Directives : ICU CONSULT  =========      HPI:  39 y/o Male PMH prior PE, prior TB presents with c/o trouble breathing.  Patient states he was previously hospitalized x 3 weeks ago discharged on 3/24 for COVID+ diagnosis. Patient  states he'd had trouble breathing during hospitalization, was discharged home about 10 days ago.     Pt came in today after his primary physician demanding he come in for evaluation. CTA confirmed large right PTX, no PE.  (03 Apr 2021 19:22)      Allergies/ Intolerances   No Known Allergies      Home Medications:      SOCIAL HX:     Smoking : [  ] Current daily  [  ] former  X  ] never         ETOH/Illicit drugs: [ X ] denies  [  ] current use :           Occupation:     PAST MEDICAL & SURGICAL HISTORY:  PE (pulmonary thromboembolism)    Tuberculosis    No significant past surgical history    FAMILY HISTORY:  No pertinent family history in first degree relatives    No known cardiovascular or pulmonary family history     ROS:  See HPI     PHYSICAL EXAM    ICU Vital Signs Last 24 Hrs  T(C): 37.1 (03 Apr 2021 19:55), Max: 37.2 (03 Apr 2021 18:45)  T(F): 98.8 (03 Apr 2021 19:55), Max: 99 (03 Apr 2021 18:45)  HR: 101 (03 Apr 2021 19:55) (86 - 112)  BP: 135/75 (03 Apr 2021 19:55) (115/59 - 143/80)  BP(mean): --  ABP: --  ABP(mean): --  RR: 20 (03 Apr 2021 19:55) (18 - 22)  SpO2: 100% (03 Apr 2021 19:55) (92% - 100%)      General: Not in distress  HEENT:  PARISA              Lymphatic system: No LN  Lungs: Bilateral BS, Chest tube in situ   Cardiovascular: Regular  Gastrointestinal: Soft, Positive BS  Musculoskeletal: No clubbing.  Moves all extremities.    Skin: Warm.  Intact  Neurological: No motor or sensory deficit         LABS:                          16.2   6.86  )-----------( 263      ( 03 Apr 2021 16:12 )             49.0      04-03    136  |  100  |  12  ----------------------------<  83  4.6   |  31  |  1.16    Ca    8.7      03 Apr 2021 17:06  Mg     1.9     04-03    TPro  7.0  /  Alb  2.8<L>  /  TBili  0.6  /  DBili  x   /  AST  27  /  ALT  41  /  AlkPhos  47  04-03      PT/INR - ( 03 Apr 2021 16:12 )   PT: 15.2 sec;   INR: 1.33 ratio         PTT - ( 03 Apr 2021 16:12 )  PTT:34.1 sec     LIVER FUNCTIONS - ( 03 Apr 2021 17:06 )  Alb: 2.8 g/dL / Pro: 7.0 gm/dL / ALK PHOS: 47 U/L / ALT: 41 U/L / AST: 27 U/L / GGT: x                                                        CARDIAC MARKERS ( 03 Apr 2021 17:06 )  <.015 ng/mL / x     / x     / x     / x                                                                                                                           COVID-19 PCR: Detected (20 Mar 2021 17:44)    ABG - ( 03 Apr 2021 16:22 )  pH, Arterial: x     pH, Blood: 7.46  /  pCO2: 39    /  pO2: 64    / HCO3: 27    / Base Excess: 3.8   /  SaO2: 92                  CXR:    ECHO:    MEDICATIONS  (STANDING):  albuterol/ipratropium for Nebulization 3 milliLiter(s) Nebulizer every 6 hours  budesonide 160 MICROgram(s)/formoterol 4.5 MICROgram(s) Inhaler 2 Puff(s) Inhalation two times a day  ceFAZolin   IVPB 1000 milliGRAM(s) IV Intermittent every 8 hours  enoxaparin Injectable 40 milliGRAM(s) SubCutaneous daily    MEDICATIONS  (PRN):    ASSESSMENT :   ==============  40 year old gentleman with PMH PE, TB recent covid infection presents with Pneumothorax   S/P needle decompression and CT placement in ED     DISPOSITION: this patient does not currently require critical care . please recall if condition changes     PLAN:  ========    NEURO:  -nuero checks per routine     PULM:  - maintain CT to low continuos suction   - maintain O2 > 92  - thoracic consult by ED     CV:  -Maintain MAP > 65    GI:  -advance diet as tolerated       RENAL:  -maintain urine output > 0.5cc/kg/hr     :  GARCIA yes [  ] NO [ X ] insertion date     ID:  ceFAZolin   IVPB 1000 milliGRAM(s)      ENDO:  -monitor glucose     HEME:  enoxaparin Injectable 40 milliGRAM(s)  -patient may benefit form hematology evaluation with history of PE with no significant cause     MUSC:     Goals of Care:   Advanced Directives :    ======================  Agree with above.  PTX s/p chest tube  Hemodynamically stable  No need for ICU at this time.

## 2021-04-03 NOTE — ED PROVIDER NOTE - CLINICAL SUMMARY MEDICAL DECISION MAKING FREE TEXT BOX
sob. cta shows tpnthx. decompressed and pigtail placed. admit.   I read ekg as nsr rate 99, no st elevation or depression, qtc 431, narrow qrs, left axis deviation.

## 2021-04-04 DIAGNOSIS — J93.0 SPONTANEOUS TENSION PNEUMOTHORAX: ICD-10-CM

## 2021-04-04 DIAGNOSIS — U07.1 COVID-19: ICD-10-CM

## 2021-04-04 LAB
ANION GAP SERPL CALC-SCNC: 8 MMOL/L — SIGNIFICANT CHANGE UP (ref 5–17)
BUN SERPL-MCNC: 11 MG/DL — SIGNIFICANT CHANGE UP (ref 7–23)
CALCIUM SERPL-MCNC: 9.5 MG/DL — SIGNIFICANT CHANGE UP (ref 8.5–10.1)
CHLORIDE SERPL-SCNC: 103 MMOL/L — SIGNIFICANT CHANGE UP (ref 96–108)
CO2 SERPL-SCNC: 29 MMOL/L — SIGNIFICANT CHANGE UP (ref 22–31)
COVID-19 SPIKE DOMAIN AB INTERP: POSITIVE
COVID-19 SPIKE DOMAIN ANTIBODY RESULT: >250 U/ML — HIGH
CREAT SERPL-MCNC: 1.12 MG/DL — SIGNIFICANT CHANGE UP (ref 0.5–1.3)
FLUAV AG NPH QL: SIGNIFICANT CHANGE UP
FLUBV AG NPH QL: SIGNIFICANT CHANGE UP
GLUCOSE SERPL-MCNC: 85 MG/DL — SIGNIFICANT CHANGE UP (ref 70–99)
MAGNESIUM SERPL-MCNC: 2.1 MG/DL — SIGNIFICANT CHANGE UP (ref 1.6–2.6)
PHOSPHATE SERPL-MCNC: 4 MG/DL — SIGNIFICANT CHANGE UP (ref 2.5–4.5)
POTASSIUM SERPL-MCNC: 3.9 MMOL/L — SIGNIFICANT CHANGE UP (ref 3.5–5.3)
POTASSIUM SERPL-SCNC: 3.9 MMOL/L — SIGNIFICANT CHANGE UP (ref 3.5–5.3)
SARS-COV-2 IGG+IGM SERPL QL IA: >250 U/ML — HIGH
SARS-COV-2 IGG+IGM SERPL QL IA: POSITIVE
SARS-COV-2 RNA SPEC QL NAA+PROBE: DETECTED
SODIUM SERPL-SCNC: 140 MMOL/L — SIGNIFICANT CHANGE UP (ref 135–145)

## 2021-04-04 PROCEDURE — 99222 1ST HOSP IP/OBS MODERATE 55: CPT

## 2021-04-04 PROCEDURE — 71045 X-RAY EXAM CHEST 1 VIEW: CPT | Mod: 26

## 2021-04-04 PROCEDURE — 99233 SBSQ HOSP IP/OBS HIGH 50: CPT

## 2021-04-04 RX ORDER — POTASSIUM CHLORIDE 20 MEQ
20 PACKET (EA) ORAL ONCE
Refills: 0 | Status: COMPLETED | OUTPATIENT
Start: 2021-04-04 | End: 2021-04-04

## 2021-04-04 RX ORDER — ENOXAPARIN SODIUM 100 MG/ML
40 INJECTION SUBCUTANEOUS EVERY 12 HOURS
Refills: 0 | Status: DISCONTINUED | OUTPATIENT
Start: 2021-04-04 | End: 2021-04-09

## 2021-04-04 RX ORDER — ALBUTEROL 90 UG/1
2 AEROSOL, METERED ORAL EVERY 6 HOURS
Refills: 0 | Status: DISCONTINUED | OUTPATIENT
Start: 2021-04-04 | End: 2021-04-09

## 2021-04-04 RX ORDER — MORPHINE SULFATE 50 MG/1
2 CAPSULE, EXTENDED RELEASE ORAL ONCE
Refills: 0 | Status: DISCONTINUED | OUTPATIENT
Start: 2021-04-04 | End: 2021-04-04

## 2021-04-04 RX ORDER — TIOTROPIUM BROMIDE 18 UG/1
1 CAPSULE ORAL; RESPIRATORY (INHALATION) DAILY
Refills: 0 | Status: DISCONTINUED | OUTPATIENT
Start: 2021-04-04 | End: 2021-04-09

## 2021-04-04 RX ADMIN — Medication 3 MILLILITER(S): at 11:13

## 2021-04-04 RX ADMIN — Medication 100 MILLIGRAM(S): at 15:23

## 2021-04-04 RX ADMIN — MORPHINE SULFATE 2 MILLIGRAM(S): 50 CAPSULE, EXTENDED RELEASE ORAL at 04:37

## 2021-04-04 RX ADMIN — Medication 20 MILLIEQUIVALENT(S): at 12:57

## 2021-04-04 RX ADMIN — MORPHINE SULFATE 2 MILLIGRAM(S): 50 CAPSULE, EXTENDED RELEASE ORAL at 10:37

## 2021-04-04 RX ADMIN — MORPHINE SULFATE 2 MILLIGRAM(S): 50 CAPSULE, EXTENDED RELEASE ORAL at 10:00

## 2021-04-04 RX ADMIN — MORPHINE SULFATE 2 MILLIGRAM(S): 50 CAPSULE, EXTENDED RELEASE ORAL at 04:55

## 2021-04-04 RX ADMIN — BUDESONIDE AND FORMOTEROL FUMARATE DIHYDRATE 2 PUFF(S): 160; 4.5 AEROSOL RESPIRATORY (INHALATION) at 05:01

## 2021-04-04 RX ADMIN — Medication 100 MILLIGRAM(S): at 05:00

## 2021-04-04 RX ADMIN — Medication 3 MILLILITER(S): at 00:29

## 2021-04-04 RX ADMIN — Medication 3 MILLILITER(S): at 05:19

## 2021-04-04 RX ADMIN — ENOXAPARIN SODIUM 40 MILLIGRAM(S): 100 INJECTION SUBCUTANEOUS at 12:55

## 2021-04-04 NOTE — CHART NOTE - NSCHARTNOTEFT_GEN_A_CORE
Called by RN stating patient has 6 beats of NSVT on telemetry.  Patient asymptomatic and vitals stable.  Will order electolytes (K & mag).  Cont telemetry monitoring.  Will cont to monitor. Called by RN stating patient has 6 beats of NSVT on telemetry.  Patient asymptomatic and vitals stable.  Electrolytes ordered and K is 3.9 and mag 2.1.  WIll give 20 meq K.  Cont telemetry monitoring.  Will cont to monitor.

## 2021-04-04 NOTE — PROGRESS NOTE ADULT - ASSESSMENT
39 y/o Male PMH PE, TB presents with c/o trouble breathing. Patient states he was previously hospitalized x 3 weeks after COVID+ diagnosis. Patient  states he'd had trouble breathing during hospitalization but upon discharge he noticed his difficulty breathing. Pt came in today after avoiding to come in, due to his primary physician demanding he come in for evaluation. CTA confirmed large right PTX, no PE.     Pulmonary:  Acute right sided PTX with recent COVID pneumonia. ER placed chest tube today. No PE on CTA. Likely has undiagnosed COPD as bullae and emphysema seen on CTA. Smokes cigarettes and other substances for over 20 years. COVID negative in ER  now COVID + .     Will add 100 % NRB, symbicort bid and duonebs. Chest tube in place. Discussed case with surgery PA, will follow daily for chest tube management.

## 2021-04-04 NOTE — PROGRESS NOTE ADULT - SUBJECTIVE AND OBJECTIVE BOX
HPI:  39 y/o Male PMH prior PE, prior TB presents with c/o trouble breathing.  Patient states he was previously hospitalized x 3 weeks ago for COVID+ diagnosis. Patient  states he'd had trouble breathing during hospitalization, was discharged home about 10 days ago.     Pt came in today after his primary physician demanding he come in for evaluation. CTA confirmed large right PTX, no PE.  (03 Apr 2021 19:22)    Patient is a 40y old  Male who presents with a chief complaint of SOB from right sided PTX. (04 Apr 2021 11:13)      INTERVAL HPI/OVERNIGHT EVENTS: chest still feels "tight" but is breathing better     MEDICATIONS  (STANDING):  albuterol/ipratropium for Nebulization 3 milliLiter(s) Nebulizer every 6 hours  budesonide 160 MICROgram(s)/formoterol 4.5 MICROgram(s) Inhaler 2 Puff(s) Inhalation two times a day  ceFAZolin   IVPB 1000 milliGRAM(s) IV Intermittent every 8 hours  enoxaparin Injectable 40 milliGRAM(s) SubCutaneous daily    MEDICATIONS  (PRN):      Allergies    No Known Allergies    Intolerances        REVIEW OF SYSTEMS:  CONSTITUTIONAL: No fever, weight loss, or fatigue  EYES: No eye pain, visual disturbances, or discharge  ENMT:  No difficulty hearing, tinnitus, vertigo; No sinus or throat pain  NECK: No pain or stiffness  RESPIRATORY: No cough, wheezing, chills or hemoptysis; No shortness of breath  CARDIOVASCULAR: No chest pain, palpitations, dizziness, or leg swelling  GASTROINTESTINAL: No abdominal or epigastric pain. No nausea, vomiting, or hematemesis; No diarrhea or constipation. No melena or hematochezia.  GENITOURINARY: No dysuria, frequency, hematuria, or incontinence  NEUROLOGICAL: No headaches, memory loss, loss of strength, numbness, or tremors  SKIN: No itching, burning, rashes, or lesions   LYMPH NODES: No enlarged glands  ENDOCRINE: No heat or cold intolerance; No hair loss  MUSCULOSKELETAL: No joint pain or swelling; No muscle, back, or extremity pain  PSYCHIATRIC: No depression, anxiety, mood swings, or difficulty sleeping  HEME/LYMPH: No easy bruising, or bleeding gums  ALLERGY AND IMMUNOLOGIC: No hives or eczema    Vital Signs Last 24 Hrs  T(C): 36.6 (04 Apr 2021 11:25), Max: 37.2 (03 Apr 2021 18:45)  T(F): 97.8 (04 Apr 2021 11:25), Max: 99 (03 Apr 2021 18:45)  HR: 82 (04 Apr 2021 11:25) (72 - 112)  BP: 127/85 (04 Apr 2021 11:25) (115/59 - 143/80)  BP(mean): 92 (03 Apr 2021 21:00) (92 - 92)  RR: 18 (04 Apr 2021 11:25) (18 - 22)  SpO2: 100% (04 Apr 2021 11:25) (92% - 100%)    PHYSICAL EXAM:  GENERAL: NAD, well-groomed, well-developed  HEAD:  Atraumatic, Normocephalic  EYES: EOMI, PERRLA, conjunctiva and sclera clear  ENMT: No tonsillar erythema, exudates, or enlargement; Moist mucous membranes, Good dentition, No lesions  NECK: Supple, No JVD, Normal thyroid  NERVOUS SYSTEM:  Alert & Oriented X3, Good concentration; Motor Strength 5/5 B/L upper and lower extremities; DTRs 2+ intact and symmetric  CHEST/LUNG: Chest tube   HEART: Regular rate and rhythm; No murmurs, rubs, or gallops  ABDOMEN: Soft, Nontender, Nondistended; Bowel sounds present  EXTREMITIES:  2+ Peripheral Pulses, No clubbing, cyanosis, or edema  LYMPH: No lymphadenopathy noted  SKIN: No rashes or lesions    LABS:                        16.2   6.86  )-----------( 263      ( 03 Apr 2021 16:12 )             49.0     04-04    140  |  103  |  11  ----------------------------<  85  3.9   |  29  |  1.12    Ca    9.5      04 Apr 2021 10:22  Phos  4.0     04-04  Mg     2.1     04-04    TPro  7.0  /  Alb  2.8<L>  /  TBili  0.6  /  DBili  x   /  AST  27  /  ALT  41  /  AlkPhos  47  04-03    PT/INR - ( 03 Apr 2021 16:12 )   PT: 15.2 sec;   INR: 1.33 ratio         PTT - ( 03 Apr 2021 16:12 )  PTT:34.1 sec    CAPILLARY BLOOD GLUCOSE          RADIOLOGY & ADDITIONAL TESTS:    Imaging Personally Reviewed:  [ ] YES  [ ] NO    Consultant(s) Notes Reviewed:  [ ] YES  [ ] NO    Care Discussed with Consultants/Other Providers [ ] YES  [ ] NO4

## 2021-04-04 NOTE — PROGRESS NOTE ADULT - SUBJECTIVE AND OBJECTIVE BOX
40y old male who presented to ED and admitted 4/3/2021 with a chief complaint of SOB from right sided PTX.      Pertinent PMH:  Patient states he was previously hospitalized x 3 weeks ago for COVID + diagnosis and was discharged home about 10 days ago.      He thereafter followed up with his PCP who demanded he come to the ED for evaluation.  CTA confirmed large right PTX, no PE.    He is s/p right pigtail placement by ED.      TODAY:  - Patient seen and examined at bedside with no complaints.  A&Ox4  -  Right pigtail to LWS draining serous fluid.    -  Nasal Cannula 4L O2   -  States he "feels better" but has discomfort at pigtail site.    -  Denies f/c/n/v/d   -  Tolerating diet.    Vital Signs Last 24 Hrs  T(F): 97.8 (04-04-21 @ 04:22), Max: 99 (04-03-21 @ 18:45)  HR: 89 (04-04-21 @ 10:25)  BP: 132/78 (04-04-21 @ 10:25)  RR: 18 (04-04-21 @ 10:25)  SpO2: 98% (04-04-21 @ 05:50)  Wt(kg): --   CAPILLARY BLOOD GLUCOSE      GENERAL: Alert, NAD  CHEST/LUNG:  respirations nonlabored, Right pigtail to LWS with serous drainage; Nasal Cannula at 4L.    HEART: Regular rate and rhythm;   ABDOMEN: + Bowel sounds, soft, Nontender, Nondistended  EXTREMITIES:  no calf tenderness, No edema    I&O's Detail  03 Apr 2021 07:01  -  04 Apr 2021 07:00  --------------------------------------------------------  IN:  Total IN: 0 mL    OUT:    Chest Tube (mL): 14 mL    Voided (mL): 500 mL  Total OUT: 514 mL    Total NET: -514 mL      LABS:                        16.2   6.86  )-----------( 263      ( 03 Apr 2021 16:12 )             49.0     04-04    140  |  103  |  11  ----------------------------<  85  3.9   |  29  |  1.12    Ca    9.5      04 Apr 2021 10:22  Phos  4.0     04-04  Mg     2.1     04-04    TPro  7.0  /  Alb  2.8<L>  /  TBili  0.6  /  DBili  x   /  AST  27  /  ALT  41  /  AlkPhos  47  04-03    PT/INR - ( 03 Apr 2021 16:12 )   PT: 15.2 sec;   INR: 1.33 ratio       PTT - ( 03 Apr 2021 16:12 )  PTT:34.1 sec          Imaging:  COMPARISON: CT chest 4/3/2021 available for review.    FINDINGS:  RIGHT multi-sidehole pigtail catheter overlies RIGHT lower hemithorax.  RIGHT lung reexpanded with residual RIGHT basilar and peripheral airspace opacity/consolidation.. LEFT lung clear.  No pneumothorax..    The heart and mediastinum are within normal limits.    Visualized osseous structures are intact.      IMPRESSION:   Reexpansion of RIGHT lung following placement of the RIGHT multi-sidehole pigtail catheter..  Residual RIGHT lower hemithorax and peripheral airspace disease/consolidation.  FOLLOW-UP CHEST RADIOGRAPH 4/4/2021 AT 7:36 AM SHOWS NO SIGNIFICANT CHANGE.              Plan:  - decrease nasal cannula with goalO2 sat >95% on room air.  -  continue right pigtail to lws - monitor output.    - f/u labs  - DVT prophylaxis, Incentive Spirometer, OOB, Ambulating, pain control  -  Will discuss with Dr. Reyes   40y old male who presented to ED and admitted 4/3/2021 with a chief complaint of SOB from right sided PTX.      Pertinent PMH:  Patient states he was previously hospitalized x 3 weeks ago for COVID + diagnosis and was discharged home about 10 days ago.      He thereafter followed up with his PCP who demanded he come to the ED for evaluation.  CTA confirmed large right PTX, no PE.    He is s/p right pigtail placement by ED.      TODAY:  - Patient seen and examined at bedside with no complaints.  A&Ox4  -  Right pigtail to LWS draining serous fluid.    -  Nasal Cannula 4L O2   -  States he "feels better" but has discomfort at pigtail site.    -  Denies f/c/n/v/d   -  Tolerating diet.    Vital Signs Last 24 Hrs  T(F): 97.8 (04-04-21 @ 04:22), Max: 99 (04-03-21 @ 18:45)  HR: 89 (04-04-21 @ 10:25)  BP: 132/78 (04-04-21 @ 10:25)  RR: 18 (04-04-21 @ 10:25)  SpO2: 98% (04-04-21 @ 05:50)  Wt(kg): --   CAPILLARY BLOOD GLUCOSE      GENERAL: Alert, NAD  CHEST/LUNG:  respirations nonlabored, Right pigtail to LWS with serous drainage; Nasal Cannula at 4L.    HEART: Regular rate and rhythm;   ABDOMEN: + Bowel sounds, soft, Nontender, Nondistended  EXTREMITIES:  no calf tenderness, No edema    I&O's Detail  03 Apr 2021 07:01  -  04 Apr 2021 07:00  --------------------------------------------------------  IN:  Total IN: 0 mL    OUT:    Chest Tube (mL): 14 mL    Voided (mL): 500 mL  Total OUT: 514 mL    Total NET: -514 mL      LABS:                        16.2   6.86  )-----------( 263      ( 03 Apr 2021 16:12 )             49.0     04-04    140  |  103  |  11  ----------------------------<  85  3.9   |  29  |  1.12    Ca    9.5      04 Apr 2021 10:22  Phos  4.0     04-04  Mg     2.1     04-04    TPro  7.0  /  Alb  2.8<L>  /  TBili  0.6  /  DBili  x   /  AST  27  /  ALT  41  /  AlkPhos  47  04-03    PT/INR - ( 03 Apr 2021 16:12 )   PT: 15.2 sec;   INR: 1.33 ratio       PTT - ( 03 Apr 2021 16:12 )  PTT:34.1 sec        Imaging:  COMPARISON: CT chest 4/3/2021 available for review.    FINDINGS:  RIGHT multi-sidehole pigtail catheter overlies RIGHT lower hemithorax.  RIGHT lung reexpanded with residual RIGHT basilar and peripheral airspace opacity/consolidation.. LEFT lung clear.  No pneumothorax..    The heart and mediastinum are within normal limits.    Visualized osseous structures are intact.    IMPRESSION:   Reexpansion of RIGHT lung following placement of the RIGHT multi-sidehole pigtail catheter..  Residual RIGHT lower hemithorax and peripheral airspace disease/consolidation.  FOLLOW-UP CHEST RADIOGRAPH 4/4/2021 AT 7:36 AM SHOWS NO SIGNIFICANT CHANGE.      Plan:  - decrease nasal cannula with goalO2 sat >95% on room air.  -  continue right pigtail to lws - monitor output.    - f/u labs  - DVT prophylaxis, Incentive Spirometer, OOB, Ambulating, pain control  - Repeat COVID PCR - per Dr. Reyes request.

## 2021-04-04 NOTE — CONSULT NOTE ADULT - ASSESSMENT
CANDICE MEJIAS [id number] 4/3/2021 DR DIANE SMILEY     Initial evaluation/Pulmonary Critical Care consultation requested on   4/4/2021 by Dr Smiley  from Dr Ramirez   Patient examined chart reviewed    HOSPITAL ADMISSION   PATIENT CAME  FROM (if information available)      CANDICE MEJIAS 40 1980 4/3/2021 DR DIANE SMILEY           REVIEW OF SYMPTOMS      Able to give ROS  Yes     RELIABLE +/-   CONSTITUTIONAL Weakness Yes  Chills No   ENDOCRINE  No heat or cold intolerance    ALLERGY No hives  Sore throat No stridor  RESP Coughing blood no  Shortness of breath YES   NEURO No Headache  Confusion Pain neck No   CARDIAC No Chest pain No Palpitations   GI  Pain abdomen NO   Vomiting NO     PHYSICAL EXAM    HEENT Unremarkable  atraumatic   RESP Fair air entry EXP prolonged    Harsh breath sound Resp distres mild   CARDIAC S1 S2 No S3     NO JVD    ABDOMEN SOFT BS PRESENT NOT DISTENDED No hepatosplenomegaly PEDAL EDEMA present No calf tenderness  NO rash     PATIENT SUMMARY  40 m former smoker quit 1 p 1 y agopast ho tb ho pulmonary embolism admitted 4/3 with large r pneumothorax He hd been rxed for covid 3 w ago admitted 3/21-3/24 dced home after observtion No rdv or dex given   Pt readmitted 4/3 with dysonea and was found to have R pntx   R pigtail was placed 4/3 with reexpansion of lung  cts on case   His scv2 was negv 4/3 but posv 4/4   Pulm consulted 4/4/2021       PROBLEMS.  SCV2 POSV 4/4/2021   SCV2 ADMISSION 3/21-3/24  LARGE R PNEUMOTHORAX 4/3 CT [facility]   PIGTAIL 4/3   ELEVATED D-DIMR 4/3 d-d 464   NSVT EPISODE 4/4      PATIENT DATA                          OXYGENATION.    4/4/2021 nc 98%      ABG.         4/3/2021 ra 746/39/64     VITALS/IO/VENT/DRIPS.  4/4/2021 AFEB 82 120/80     GLOBAL AND BEST PRACTICE ISSUES                     ALLERGY.  nka    WEIGHT.            4/4/2021 84k                    BMI.                  4/4/2021 25      ADVANCED DIRECTIVE.                               HEAD OF BED ELEVATION. Yes  DVT PROPHYLAXIS.   lvnx 40 (4/3)                    MEAD PROPHYLAXIS.   APA.                                                                    DYSPHAGIA RECOMM.   DIET.    REG (4/3)   INFECTION PROPHYLAXIS.   FREE WATER.      ASSESSMENT/RECOMMENDATIONS.    COVID   Monitor markers  Monitor oxygenation  If requiring oxygen to maintain pulse ox above 88 suggest ID eval and rx with Remdesevir and dexamethasone   Oxygen supplementation as needed to keep pulse ox 90-95%  PNEUMOTHORAX SPONTANEOUS IN SETTING OF COVID   Has chest tube   Is being followed by cts   Monitor closely DC chest tube once airleak stops and pt tolerates clamping chets tube   ELEVATED D-DIMER   4/3 cta ch no pe   Elevated d-dimr in setting of covid and past ho pe   Will change lovenox to 40 q 12 h and check v duplx   COPD  On duoneb symbicort   4/4/2021 Add spiriva     TIME SPENT   Over 55 minutes aggregate care time spent on encounter; activities included   direct patient care, counseling and/or coordinating care reviewing notes, lab data/ imaging , discussion with multidisciplinary team/ patient  /family and explaining in detail risks, benefits, alternatives  of the recommendations     CANDICE MEJIAS [id number] 4/3/2021 DR DAINE SMILEY

## 2021-04-04 NOTE — CONSULT NOTE ADULT - SUBJECTIVE AND OBJECTIVE BOX
Pulmonary/Critical Care Followup    PAST MEDICAL & SURGICAL HISTORY:  PE (pulmonary thromboembolism)    Tuberculosis    No significant past surgical history        Allergies    No Known Allergies    Intolerances        FAMILY HISTORY:  No pertinent family history in first degree relatives        SOCIAL HISTORY:      MEDICATIONS  (STANDING):  ALBUTerol    90 MICROgram(s) HFA Inhaler 2 Puff(s) Inhalation every 6 hours  budesonide 160 MICROgram(s)/formoterol 4.5 MICROgram(s) Inhaler 2 Puff(s) Inhalation two times a day  enoxaparin Injectable 40 milliGRAM(s) SubCutaneous every 12 hours  tiotropium 18 MICROgram(s) Capsule 1 Capsule(s) Inhalation daily    MEDICATIONS  (PRN):      Vital Signs Last 24 Hrs  T(C): 36.6 (04 Apr 2021 16:25), Max: 36.7 (03 Apr 2021 23:23)  T(F): 97.8 (04 Apr 2021 16:25), Max: 98 (03 Apr 2021 23:23)  HR: 78 (04 Apr 2021 16:25) (72 - 90)  BP: 136/84 (04 Apr 2021 16:25) (117/73 - 136/84)  BP(mean): --  RR: 18 (04 Apr 2021 16:25) (18 - 18)  SpO2: 98% (04 Apr 2021 16:25) (98% - 100%)    LABS:                        16.2   6.86  )-----------( 263      ( 03 Apr 2021 16:12 )             49.0     04-04    140  |  103  |  11  ----------------------------<  85  3.9   |  29  |  1.12    Ca    9.5      04 Apr 2021 10:22  Phos  4.0     04-04  Mg     2.1     04-04    TPro  7.0  /  Alb  2.8<L>  /  TBili  0.6  /  DBili  x   /  AST  27  /  ALT  41  /  AlkPhos  47  04-03    PT/INR - ( 03 Apr 2021 16:12 )   PT: 15.2 sec;   INR: 1.33 ratio         PTT - ( 03 Apr 2021 16:12 )  PTT:34.1 sec      ABG - ( 03 Apr 2021 16:22 )  pH, Arterial: x     pH, Blood: 7.46  /  pCO2: 39    /  pO2: 64    / HCO3: 27    / Base Excess: 3.8   /  SaO2: 92                  WBC:  WBC Count: 6.86 K/uL (04-03 @ 16:12)      MICROBIOLOGY:  RECENT CULTURES:        CARDIAC MARKERS ( 03 Apr 2021 17:06 )  <.015 ng/mL / x     / x     / x     / x            PT/INR - ( 03 Apr 2021 16:12 )   PT: 15.2 sec;   INR: 1.33 ratio         PTT - ( 03 Apr 2021 16:12 )  PTT:34.1 sec    Sodium:  Sodium, Serum: 140 mmol/L (04-04 @ 10:22)  Sodium, Serum: 136 mmol/L (04-03 @ 17:06)      1.12 mg/dL 04-04 @ 10:22  1.16 mg/dL 04-03 @ 17:06      Hemoglobin:  Hemoglobin: 16.2 g/dL (04-03 @ 16:12)      Platelets: Platelet Count - Automated: 263 K/uL (04-03 @ 16:12)      LIVER FUNCTIONS - ( 03 Apr 2021 17:06 )  Alb: 2.8 g/dL / Pro: 7.0 gm/dL / ALK PHOS: 47 U/L / ALT: 41 U/L / AST: 27 U/L / GGT: x                 RADIOLOGY & ADDITIONAL STUDIES:

## 2021-04-05 DIAGNOSIS — I26.99 OTHER PULMONARY EMBOLISM WITHOUT ACUTE COR PULMONALE: ICD-10-CM

## 2021-04-05 DIAGNOSIS — A15.9 RESPIRATORY TUBERCULOSIS UNSPECIFIED: ICD-10-CM

## 2021-04-05 LAB
ALBUMIN SERPL ELPH-MCNC: 2.8 G/DL — LOW (ref 3.3–5)
ALP SERPL-CCNC: 45 U/L — SIGNIFICANT CHANGE UP (ref 40–120)
ALT FLD-CCNC: 34 U/L — SIGNIFICANT CHANGE UP (ref 12–78)
ANION GAP SERPL CALC-SCNC: 7 MMOL/L — SIGNIFICANT CHANGE UP (ref 5–17)
AST SERPL-CCNC: 21 U/L — SIGNIFICANT CHANGE UP (ref 15–37)
BASOPHILS # BLD AUTO: 0.02 K/UL — SIGNIFICANT CHANGE UP (ref 0–0.2)
BASOPHILS NFR BLD AUTO: 0.5 % — SIGNIFICANT CHANGE UP (ref 0–2)
BILIRUB SERPL-MCNC: 0.6 MG/DL — SIGNIFICANT CHANGE UP (ref 0.2–1.2)
BUN SERPL-MCNC: 9 MG/DL — SIGNIFICANT CHANGE UP (ref 7–23)
CALCIUM SERPL-MCNC: 9.3 MG/DL — SIGNIFICANT CHANGE UP (ref 8.5–10.1)
CHLORIDE SERPL-SCNC: 102 MMOL/L — SIGNIFICANT CHANGE UP (ref 96–108)
CO2 SERPL-SCNC: 29 MMOL/L — SIGNIFICANT CHANGE UP (ref 22–31)
CREAT SERPL-MCNC: 1.05 MG/DL — SIGNIFICANT CHANGE UP (ref 0.5–1.3)
D DIMER BLD IA.RAPID-MCNC: 577 NG/ML DDU — HIGH
EOSINOPHIL # BLD AUTO: 0.19 K/UL — SIGNIFICANT CHANGE UP (ref 0–0.5)
EOSINOPHIL NFR BLD AUTO: 4.3 % — SIGNIFICANT CHANGE UP (ref 0–6)
FERRITIN SERPL-MCNC: 462 NG/ML — HIGH (ref 30–400)
GLUCOSE SERPL-MCNC: 91 MG/DL — SIGNIFICANT CHANGE UP (ref 70–99)
HCT VFR BLD CALC: 42.7 % — SIGNIFICANT CHANGE UP (ref 39–50)
HGB BLD-MCNC: 14 G/DL — SIGNIFICANT CHANGE UP (ref 13–17)
IMM GRANULOCYTES NFR BLD AUTO: 0.2 % — SIGNIFICANT CHANGE UP (ref 0–1.5)
INR BLD: 1.38 RATIO — HIGH (ref 0.88–1.16)
LYMPHOCYTES # BLD AUTO: 1.55 K/UL — SIGNIFICANT CHANGE UP (ref 1–3.3)
LYMPHOCYTES # BLD AUTO: 35.1 % — SIGNIFICANT CHANGE UP (ref 13–44)
MAGNESIUM SERPL-MCNC: 2.1 MG/DL — SIGNIFICANT CHANGE UP (ref 1.6–2.6)
MCHC RBC-ENTMCNC: 28.7 PG — SIGNIFICANT CHANGE UP (ref 27–34)
MCHC RBC-ENTMCNC: 32.8 GM/DL — SIGNIFICANT CHANGE UP (ref 32–36)
MCV RBC AUTO: 87.7 FL — SIGNIFICANT CHANGE UP (ref 80–100)
MONOCYTES # BLD AUTO: 0.4 K/UL — SIGNIFICANT CHANGE UP (ref 0–0.9)
MONOCYTES NFR BLD AUTO: 9.1 % — SIGNIFICANT CHANGE UP (ref 2–14)
NEUTROPHILS # BLD AUTO: 2.24 K/UL — SIGNIFICANT CHANGE UP (ref 1.8–7.4)
NEUTROPHILS NFR BLD AUTO: 50.8 % — SIGNIFICANT CHANGE UP (ref 43–77)
NRBC # BLD: 0 /100 WBCS — SIGNIFICANT CHANGE UP (ref 0–0)
PHOSPHATE SERPL-MCNC: 3 MG/DL — SIGNIFICANT CHANGE UP (ref 2.5–4.5)
PLATELET # BLD AUTO: 209 K/UL — SIGNIFICANT CHANGE UP (ref 150–400)
POTASSIUM SERPL-MCNC: 4 MMOL/L — SIGNIFICANT CHANGE UP (ref 3.5–5.3)
POTASSIUM SERPL-SCNC: 4 MMOL/L — SIGNIFICANT CHANGE UP (ref 3.5–5.3)
PROCALCITONIN SERPL-MCNC: 0.05 NG/ML — SIGNIFICANT CHANGE UP (ref 0.02–0.1)
PROT SERPL-MCNC: 7.3 GM/DL — SIGNIFICANT CHANGE UP (ref 6–8.3)
PROTHROM AB SERPL-ACNC: 15.8 SEC — HIGH (ref 10.6–13.6)
RBC # BLD: 4.87 M/UL — SIGNIFICANT CHANGE UP (ref 4.2–5.8)
RBC # FLD: 12.9 % — SIGNIFICANT CHANGE UP (ref 10.3–14.5)
SODIUM SERPL-SCNC: 138 MMOL/L — SIGNIFICANT CHANGE UP (ref 135–145)
WBC # BLD: 4.41 K/UL — SIGNIFICANT CHANGE UP (ref 3.8–10.5)
WBC # FLD AUTO: 4.41 K/UL — SIGNIFICANT CHANGE UP (ref 3.8–10.5)

## 2021-04-05 PROCEDURE — 93970 EXTREMITY STUDY: CPT | Mod: 26

## 2021-04-05 PROCEDURE — 99233 SBSQ HOSP IP/OBS HIGH 50: CPT

## 2021-04-05 PROCEDURE — 71045 X-RAY EXAM CHEST 1 VIEW: CPT | Mod: 26

## 2021-04-05 PROCEDURE — 99223 1ST HOSP IP/OBS HIGH 75: CPT

## 2021-04-05 PROCEDURE — 99232 SBSQ HOSP IP/OBS MODERATE 35: CPT

## 2021-04-05 RX ADMIN — ALBUTEROL 2 PUFF(S): 90 AEROSOL, METERED ORAL at 17:31

## 2021-04-05 RX ADMIN — BUDESONIDE AND FORMOTEROL FUMARATE DIHYDRATE 2 PUFF(S): 160; 4.5 AEROSOL RESPIRATORY (INHALATION) at 17:30

## 2021-04-05 RX ADMIN — ENOXAPARIN SODIUM 40 MILLIGRAM(S): 100 INJECTION SUBCUTANEOUS at 17:30

## 2021-04-05 RX ADMIN — ENOXAPARIN SODIUM 40 MILLIGRAM(S): 100 INJECTION SUBCUTANEOUS at 06:02

## 2021-04-05 RX ADMIN — ALBUTEROL 2 PUFF(S): 90 AEROSOL, METERED ORAL at 06:03

## 2021-04-05 RX ADMIN — BUDESONIDE AND FORMOTEROL FUMARATE DIHYDRATE 2 PUFF(S): 160; 4.5 AEROSOL RESPIRATORY (INHALATION) at 06:14

## 2021-04-05 RX ADMIN — TIOTROPIUM BROMIDE 1 CAPSULE(S): 18 CAPSULE ORAL; RESPIRATORY (INHALATION) at 13:21

## 2021-04-05 RX ADMIN — ALBUTEROL 2 PUFF(S): 90 AEROSOL, METERED ORAL at 13:21

## 2021-04-05 NOTE — PROGRESS NOTE ADULT - SUBJECTIVE AND OBJECTIVE BOX
Patient currently off floor for BLE u/s.  Admitted with Covid-19 complication of right PTX.  Previously admitted one month ago for covid.  CT placed emergently in ED for tension PTX with resolution of PTX.  CT chest with multiple bullae and large loculated PTX.  History of PE and TB per chart.

## 2021-04-05 NOTE — CONSULT NOTE ADULT - SUBJECTIVE AND OBJECTIVE BOX
Full consult to follow     RANDELL HARVEY  MRN-[id number]    Patient is a 40y old  Male who presents with a chief complaint of SOB from right sided PTX. (05 Apr 2021 08:21)    HPI:  41 y/o Male PMH prior PE, prior TB presents with c/o trouble breathing.  Patient states he was previously hospitalized x 3 weeks ago for COVID+ diagnosis. Patient  states he'd had trouble breathing during hospitalization, was discharged home about 10 days ago.     Pt came in today after his primary physician demanding he come in for evaluation. CTA confirmed large right PTX, no PE.  (03 Apr 2021 19:22)    Above reviewed. Patient was admitted end of march with covid received 5 days of decadron and one dose of remdesivir. His first positive test was 3/20/21 which was more then 14 days ago. He now presented with PTX and had a chest tube placed on admission.   ID consulted for workup and antibiotic management     PAST MEDICAL & SURGICAL HISTORY:  PE (pulmonary thromboembolism)    Tuberculosis    No significant past surgical history        Allergies  No Known Allergies        ANTIMICROBIALS:      MEDICATIONS  (STANDING):  ceFAZolin   IVPB   100 mL/Hr IV Intermittent (04-04-21 @ 15:23)   100 mL/Hr IV Intermittent (04-04-21 @ 05:00)   100 mL/Hr IV Intermittent (04-03-21 @ 18:47)        OTHER MEDS: MEDICATIONS  (STANDING):  ALBUTerol    90 MICROgram(s) HFA Inhaler 2 every 6 hours  budesonide 160 MICROgram(s)/formoterol 4.5 MICROgram(s) Inhaler 2 two times a day  enoxaparin Injectable 40 every 12 hours  tiotropium 18 MICROgram(s) Capsule 1 daily      SOCIAL HISTORY:     Former smoker     FAMILY HISTORY:  No pertinent family history in first degree relatives        REVIEW OF SYSTEMS  [  ] ROS unobtainable because:    [X  ] All other systems negative except as noted below:	    Constitutional:  [ ] fever [ ] chills  [ ] weight loss  [ ] weakness  Skin:  [ ] rash [ ] phlebitis	  Eyes: [ ] icterus [ ] pain  [ ] discharge	  ENMT: [ ] sore throat  [ ] thrush [ ] ulcers [ ] exudates  Respiratory: [X ] dyspnea [ ] hemoptysis [ ] cough [ ] sputum [x] pain at catheter site 	  Cardiovascular:  [ ] chest pain [ ] palpitations [ ] edema	  Gastrointestinal:  [ ] nausea [ ] vomiting [ ] diarrhea [ ] constipation [ ] pain	  Genitourinary:  [ ] dysuria [ ] frequency [ ] hematuria [ ] discharge [ ] flank pain  [ ] incontinence  Musculoskeletal:  [ ] myalgias [ ] arthralgias [ ] arthritis  [ ] back pain  Neurological:  [ ] headache [ ] seizures  [ ] confusion/altered mental status  Psychiatric:  [ ] anxiety [ ] depression	  Hematology/Lymphatics:  [ ] lymphadenopathy  Endocrine:  [ ] adrenal [ ] thyroid  Allergic/Immunologic:	 [ ] transplant [ ] seasonal    Vital Signs Last 24 Hrs  T(F): 98.1 (04-05-21 @ 05:25), Max: 99 (04-03-21 @ 18:45)    Vital Signs Last 24 Hrs  HR: 77 (04-05-21 @ 05:25) (77 - 86)  BP: 114/75 (04-05-21 @ 05:25) (114/75 - 136/84)  RR: 18 (04-05-21 @ 05:25)  SpO2: 99% (04-05-21 @ 05:25) (98% - 100%)  Wt(kg): --    PHYSICAL EXAM:  Constitutional: non-toxic, no distress  HEAD/EYES: anicteric, no conjunctival injection  ENT:  supple, no thrush  Cardiovascular:   normal S1, S2, no murmur, no edema  Respiratory:  clear BS bilaterally, no wheezes, no rales  GI:  soft, non-tender, normal bowel sounds  :  no harris, no CVA tenderness  Musculoskeletal:  no synovitis, normal ROM  Neurologic: awake and alert, normal strength, no focal findings  Skin:  no rash, no erythema, no phlebitis  Heme/Onc: no lymphadenopathy   Psychiatric:  awake, alert, appropriate mood          WBC Count: 4.41 K/uL (04-05 @ 09:10)  WBC Count: 6.86 K/uL (04-03 @ 16:12)                            14.0   4.41  )-----------( 209      ( 05 Apr 2021 09:10 )             42.7       04-05    138  |  102  |  9   ----------------------------<  91  4.0   |  29  |  1.05    Ca    9.3      05 Apr 2021 09:10  Phos  3.0     04-05  Mg     2.1     04-05    TPro  7.3  /  Alb  2.8<L>  /  TBili  0.6  /  DBili  x   /  AST  21  /  ALT  34  /  AlkPhos  45  04-05      Creatinine Trend: 1.05<--, 1.12<--, 1.16<--, 1.09<--, 1.06<--, 0.98<--        MICROBIOLOGY:  Flu With COVID-19 By HARRIET (04.04.21 @ 14:03)    SARS-CoV-2 Result: Detected: EUA/IVD    COVID-19 PCR . (03.20.21 @ 17:44)    COVID-19 PCR: Detected    D-Dimer Assay, Quantitative: 577 (04-05)  D-Dimer Assay, Quantitative: 464 (04-03)  D-Dimer Assay, Quantitative: 172 (03-23)    RADIOLOGY:           Full consult to follow     RANDELL HARVEY  MRN-[id number]    Patient is a 40y old  Male who presents with a chief complaint of SOB from right sided PTX. (05 Apr 2021 08:21)    HPI:  41 y/o Male PMH prior PE, prior TB presents with c/o trouble breathing.  Patient states he was previously hospitalized x 3 weeks ago for COVID+ diagnosis. Patient  states he'd had trouble breathing during hospitalization, was discharged home about 10 days ago.     Pt came in today after his primary physician demanding he come in for evaluation. CTA confirmed large right PTX, no PE.  (03 Apr 2021 19:22)    Above reviewed. Patient was admitted end of march with covid received 5 days of decadron and one dose of remdesivir. His symptoms of covid started March 9th or so. His first positive test was 3/20/21 which was more then 14 days ago. He now presented with PTX and had a chest tube placed on admission. 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    ID consulted for workup and antibiotic management     PAST MEDICAL & SURGICAL HISTORY:  PE (pulmonary thromboembolism)    Tuberculosis    No significant past surgical history        Allergies  No Known Allergies        ANTIMICROBIALS:      MEDICATIONS  (STANDING):  ceFAZolin   IVPB   100 mL/Hr IV Intermittent (04-04-21 @ 15:23)   100 mL/Hr IV Intermittent (04-04-21 @ 05:00)   100 mL/Hr IV Intermittent (04-03-21 @ 18:47)        OTHER MEDS: MEDICATIONS  (STANDING):  ALBUTerol    90 MICROgram(s) HFA Inhaler 2 every 6 hours  budesonide 160 MICROgram(s)/formoterol 4.5 MICROgram(s) Inhaler 2 two times a day  enoxaparin Injectable 40 every 12 hours  tiotropium 18 MICROgram(s) Capsule 1 daily      SOCIAL HISTORY:     Former smoker     FAMILY HISTORY:  Parents have Hypertension  No FH of TB      REVIEW OF SYSTEMS  [  ] ROS unobtainable because:    [X  ] All other systems negative except as noted below:	    Constitutional:  [ ] fever [ ] chills  [ ] weight loss  [ ] weakness  Skin:  [ ] rash [ ] phlebitis	  Eyes: [ ] icterus [ ] pain  [ ] discharge	  ENMT: [ ] sore throat  [ ] thrush [ ] ulcers [ ] exudates  Respiratory: [X ] dyspnea [ ] hemoptysis [ ] cough [ ] sputum [x] pain at catheter site 	  Cardiovascular:  [ ] chest pain [ ] palpitations [ ] edema	  Gastrointestinal:  [ ] nausea [ ] vomiting [ ] diarrhea [ ] constipation [ ] pain	  Genitourinary:  [ ] dysuria [ ] frequency [ ] hematuria [ ] discharge [ ] flank pain  [ ] incontinence  Musculoskeletal:  [ ] myalgias [ ] arthralgias [ ] arthritis  [ ] back pain  Neurological:  [ ] headache [ ] seizures  [ ] confusion/altered mental status  Psychiatric:  [ ] anxiety [ ] depression	  Hematology/Lymphatics:  [ ] lymphadenopathy  Endocrine:  [ ] adrenal [ ] thyroid  Allergic/Immunologic:	 [ ] transplant [ ] seasonal    Vital Signs Last 24 Hrs  T(F): 98.1 (04-05-21 @ 05:25), Max: 99 (04-03-21 @ 18:45)    Vital Signs Last 24 Hrs  HR: 77 (04-05-21 @ 05:25) (77 - 86)  BP: 114/75 (04-05-21 @ 05:25) (114/75 - 136/84)  RR: 18 (04-05-21 @ 05:25)  SpO2: 99% (04-05-21 @ 05:25) (98% - 100%)  Wt(kg): --    PHYSICAL EXAM:  Constitutional: non-toxic, no distress  HEAD/EYES: anicteric, no conjunctival injection  ENT:  supple, no thrush  Cardiovascular:   normal S1, S2, no murmur, no edema  Respiratory:  clear BS bilaterally, no wheezes, no rales, + right sided chest tube   GI:  soft, non-tender, normal bowel sounds  :  no harris, no CVA tenderness  Musculoskeletal:  no synovitis, normal ROM  Neurologic: awake and alert, normal strength, no focal findings  Skin:  no rash, no erythema, no phlebitis  Heme/Onc: no lymphadenopathy   Psychiatric:  awake, alert, appropriate mood      WBC Count: 4.41 K/uL (04-05 @ 09:10)  WBC Count: 6.86 K/uL (04-03 @ 16:12)                            14.0   4.41  )-----------( 209      ( 05 Apr 2021 09:10 )             42.7       04-05    138  |  102  |  9   ----------------------------<  91  4.0   |  29  |  1.05    Ca    9.3      05 Apr 2021 09:10  Phos  3.0     04-05  Mg     2.1     04-05    TPro  7.3  /  Alb  2.8<L>  /  TBili  0.6  /  DBili  x   /  AST  21  /  ALT  34  /  AlkPhos  45  04-05      Creatinine Trend: 1.05<--, 1.12<--, 1.16<--, 1.09<--, 1.06<--, 0.98<--        MICROBIOLOGY:  Flu With COVID-19 By HARRIET (04.04.21 @ 14:03)    SARS-CoV-2 Result: Detected: EUA/IVD    COVID-19 PCR . (03.20.21 @ 17:44)    COVID-19 PCR: Detected    D-Dimer Assay, Quantitative: 577 (04-05)  D-Dimer Assay, Quantitative: 464 (04-03)  D-Dimer Assay, Quantitative: 172 (03-23)    RADIOLOGY:  < from: US Duplex Venous Lower Ext Complete, Bilateral (04.05.21 @ 11:30) >  IMPRESSION:    No evidence of deep venous thrombosis in either lower extremity.      Xray Chest 1 View- PORTABLE-Routine (Xray Chest 1 View- PORTABLE-Routine in AM.) (04.05.21 @ 06:58)  IMPRESSION: Elevated RIGHT hemidiaphragm with probable RIGHT lower lobe airspace disease/atelectasis  obscuring portions of RIGHT diaphragmatic contour.  No pneumothorax.  LEFT lung parenchyma clear.  RIGHT chest tube in place.    CT Angio Chest w/ IV Cont (04.03.21 @ 16:52)  IMPRESSION:  Large right pneumothorax with subtotal atelectasis of the right lung and contralateral shift of the mediastinum concerning for tension pneumothorax. Trace right pleural effusion.  No pulmonary embolism.  Emphysema and blebs/bulla as seen on prior exam.

## 2021-04-05 NOTE — PROGRESS NOTE ADULT - ASSESSMENT
41 y/o Male PMH PE, TB presents with c/o trouble breathing. Patient states he was previously hospitalized x 3 weeks after COVID+ diagnosis. Patient  states he'd had trouble breathing during hospitalization but upon discharge he noticed his difficulty breathing. Pt came in today after avoiding to come in, due to his primary physician demanding he come in for evaluation. CTA confirmed large right PTX, no PE.     Pulmonary:  Acute right sided PTX with recent COVID pneumonia. ER placed chest tube. No PE on CTA. Likely has undiagnosed COPD as bullae and emphysema seen on CTA. Smokes cigarettes and other substances for over 20 years.   COVID negative in ERnow COVID + .     Doing much better chest tube on waterseal   will check HIV

## 2021-04-05 NOTE — PROGRESS NOTE ADULT - SUBJECTIVE AND OBJECTIVE BOX
HPI:  39 y/o Male PMH prior PE, prior TB presents with c/o trouble breathing.  Patient states he was previously hospitalized x 3 weeks ago for COVID+ diagnosis. Patient  states he'd had trouble breathing during hospitalization, was discharged home about 10 days ago.     Pt came in today after his primary physician demanding he come in for evaluation. CTA confirmed large right PTX, no PE.  (03 Apr 2021 19:22)    Patient is a 40y old  Male who presents with a chief complaint of SOB from right sided PTX. (05 Apr 2021 11:35)      INTERVAL HPI/OVERNIGHT EVENTS: no acute events overnoghht     MEDICATIONS  (STANDING):  ALBUTerol    90 MICROgram(s) HFA Inhaler 2 Puff(s) Inhalation every 6 hours  budesonide 160 MICROgram(s)/formoterol 4.5 MICROgram(s) Inhaler 2 Puff(s) Inhalation two times a day  enoxaparin Injectable 40 milliGRAM(s) SubCutaneous every 12 hours  tiotropium 18 MICROgram(s) Capsule 1 Capsule(s) Inhalation daily    MEDICATIONS  (PRN):      Allergies    No Known Allergies    Intolerances        REVIEW OF SYSTEMS:  CONSTITUTIONAL: No fever, weight loss, or fatigue  EYES: No eye pain, visual disturbances, or discharge  ENMT:  No difficulty hearing, tinnitus, vertigo; No sinus or throat pain  NECK: No pain or stiffness  RESPIRATORY: mild  shortness of breath  CARDIOVASCULAR: No chest pain, palpitations, dizziness, or leg swelling  GASTROINTESTINAL: No abdominal or epigastric pain. No nausea, vomiting, or hematemesis; No diarrhea or constipation. No melena or hematochezia.  GENITOURINARY: No dysuria, frequency, hematuria, or incontinence  NEUROLOGICAL: No headaches, memory loss, loss of strength, numbness, or tremors  SKIN: No itching, burning, rashes, or lesions   LYMPH NODES: No enlarged glands  ENDOCRINE: No heat or cold intolerance; No hair loss  MUSCULOSKELETAL: No joint pain or swelling; No muscle, back, or extremity pain  PSYCHIATRIC: No depression, anxiety, mood swings, or difficulty sleeping  HEME/LYMPH: No easy bruising, or bleeding gums  ALLERGY AND IMMUNOLOGIC: No hives or eczema    Vital Signs Last 24 Hrs  T(C): 36.6 (05 Apr 2021 11:27), Max: 36.8 (05 Apr 2021 00:04)  T(F): 97.9 (05 Apr 2021 11:27), Max: 98.2 (05 Apr 2021 00:04)  HR: 82 (05 Apr 2021 11:27) (77 - 82)  BP: 111/50 (05 Apr 2021 11:27) (111/50 - 136/84  RR: 18 (05 Apr 2021 11:27) (18 - 18)  SpO2: 96% (05 Apr 2021 11:27) (96% - 99%)    PHYSICAL EXAM:  GENERAL: NAD, well-groomed, well-developed  HEAD:  Atraumatic, Normocephalic  EYES: EOMI, PERRLA, conjunctiva and sclera clear  ENMT: No tonsillar erythema, exudates, or enlargement; Moist mucous membranes, Good dentition, No lesions  NECK: Supple, No JVD, Normal thyroid  NERVOUS SYSTEM:  Alert & Oriented X3, Good concentration; Motor Strength 5/5 B/L upper and lower extremities; DTRs 2+ intact and symmetric  CHEST/LUNG: right chest tube HEART: Regular rate and rhythm; No murmurs, rubs, or gallops  ABDOMEN: Soft, Nontender, Nondistended; Bowel sounds present  EXTREMITIES:  2+ Peripheral Pulses, No clubbing, cyanosis, or edema  LYMPH: No lymphadenopathy noted  SKIN: No rashes or lesions    LABS:                        14.0   4.41  )-----------( 209      ( 05 Apr 2021 09:10 )             42.7     04-05    138  |  102  |  9   ----------------------------<  91  4.0   |  29  |  1.05    Ca    9.3      05 Apr 2021 09:10  Phos  3.0     04-05  Mg     2.1     04-05    TPro  7.3  /  Alb  2.8<L>  /  TBili  0.6  /  DBili  x   /  AST  21  /  ALT  34  /  AlkPhos  45  04-05    PT/INR - ( 05 Apr 2021 09:10 )   PT: 15.8 sec;   INR: 1.38 ratio         PTT - ( 03 Apr 2021 16:12 )  PTT:34.1 sec    CAPILLARY BLOOD GLUCOSE          RADIOLOGY & ADDITIONAL TESTS:  < from: US Duplex Venous Lower Ext Complete, Bilateral (04.05.21 @ 11:30) >  EXAM:  US DPLX LWR EXT VEINS COMPL BI                            PROCEDURE DATE:  04/05/2021          INTERPRETATION:  CLINICAL INFORMATION: Elevated d-dimer; prior history of COVID and pulmonary embolism.    COMPARISON: None available.    TECHNIQUE: Duplex sonography of the BILATERAL LOWER extremity veins with color and spectral Doppler, with and without compression.    FINDINGS:    RIGHT:  Normal compressibility of the RIGHT common femoral, femoral and popliteal veins.  Doppler examination shows normal spontaneous and phasic flow.  No RIGHT calf vein thrombosis is detected.    LEFT:  Normal compressibility of the LEFT common femoral, femoral and popliteal veins.  Doppler examination shows normal spontaneous and phasic flow.  No LEFT calf vein thrombosis is detected.    IMPRESSION:    No evidence of deep venous thrombosis in either lower extremity.    < end of copied text >  < from: Xray Chest 1 View- PORTABLE-Routine (Xray Chest 1 View- PORTABLE-Routine in AM.) (04.04.21 @ 08:14) >  EXAM:  XR CHEST PORTABLE ROUTINE 1V                          EXAM:  XR CHEST PORTABLE IMMED 1V                            PROCEDURE DATE:  04/03/2021          INTERPRETATION:  Portable chest radiograph 4/3/2021 5:52 PM with follow-up chest radiograph of 4/4/2021 at 7:36 AM.    CLINICAL INFORMATION: Pneumothorax. Follow-up    TECHNIQUE:  Portable  AP view of the chest was obtained.    COMPARISON: CT chest 4/3/2021 available for review.    FINDINGS:  RIGHT multi-sidehole pigtail catheter overliesRIGHT lower hemithorax.  RIGHT lung reexpanded with residual RIGHT basilar and peripheral airspace opacity/consolidation.. LEFT lung clear.  No pneumothorax..    The heart and mediastinum are within normal limits.    Visualized osseous structures areintact.      IMPRESSION:   Reexpansion of RIGHT lung following placement of the RIGHT multi-sidehole pigtail catheter..  Residual RIGHT lower hemithorax and peripheral airspace disease/consolidation.  FOLLOW-UP CHEST RADIOGRAPH 4/4/2021 AT 7:36 AM SHOWS NO SIGNIFICANT CHANGE.      < end of copied text >    Imaging Personally Reviewed:  [ X] YES  [ ] NO    Consultant(s) Notes Reviewed:  [ X] YES  [ ] NO    Care Discussed with Consultants/Other Providers [X ] YES  [ ] NO

## 2021-04-05 NOTE — PROGRESS NOTE ADULT - ASSESSMENT
HARVEY RANDELL [id number] 4/3/2021 DR DIANE TREJO           REVIEW OF SYMPTOMS      Able to give ROS  Yes     RELIABLE +/-   CONSTITUTIONAL Weakness Yes  Chills No   ENDOCRINE  No heat or cold intolerance    ALLERGY No hives  Sore throat No stridor  RESP Coughing blood no  Shortness of breath YES   NEURO No Headache  Confusion Pain neck No   CARDIAC No Chest pain No Palpitations   GI  Pain abdomen NO   Vomiting NO     PHYSICAL EXAM    HEENT Unremarkable  atraumatic   RESP Fair air entry EXP prolonged    Harsh breath sound Resp distres mild   CARDIAC S1 S2 No S3     NO JVD    ABDOMEN SOFT BS PRESENT NOT DISTENDED No hepatosplenomegaly PEDAL EDEMA present No calf tenderness  NO rash       PATIENT SUMMARY  40 m former smoker quit 1 p 1 y ago past ho tb ho pulmonary embolism admitted 4/3 with large r pneumothorax He hd been rxed for covid 3 w ago admitted 3/21-3/24 dced home after observtion No rdv or dex given   Pt readmitted 4/3 with dysonea and was found to have R pntx   R pigtail was placed 4/3 with reexpansion of lung  cts on case   His scv2 was negv 4/3 but posv 4/4   Pulm consulted 4/4/2021       PROBLEMS.  SCV2 POSV 4/4/2021   SCV2 ADMISSION 3/21-3/24  LARGE R PNEUMOTHORAX 4/3 CT [facility]   PIGTAIL 4/3   ELEVATED D-DIMR 4/3 d-d 464   NSVT EPISODE 4/4      PATIENT DATA                          OXYGENATION.    4/4-4/5/2021 nc 98%  -  nc 96%     ABG.         4/3/2021 ra 746/39/64     VITALS/IO/VENT/DRIPS.  4/5/2021 afeb 77 114/50     GLOBAL AND BEST PRACTICE ISSUES                     ALLERGY.  nka    WEIGHT.            4/4/2021 84k                    BMI.                  4/4/2021 25      ADVANCED DIRECTIVE.                               HEAD OF BED ELEVATION. Yes  DVT PROPHYLAXIS.   lvnx 40 (4/3)                    MEAD PROPHYLAXIS.   APA.                                                                    DYSPHAGIA RECOMM.   DIET.    REG (4/3)   INFECTION PROPHYLAXIS.     ASSESSMENT/RECOMMENDATIONS.    COVID   Monitor markers  Monitor oxygenation  Oxygen supplementation as needed to keep pulse ox 90-95%  ID consult 4/5/2021 appreciated No RDV or DEX planned as hypoxia likely sec to pntx and scv2 pcr  detected is likely inactive virus  He had scv2 admission 3/21   PNEUMOTHORAX SPONTANEOUS IN SETTING OF COVID   Has chest tube   Is being followed by cts   Monitor closely DC chest tube once airleak stops and pt tolerates clamping chets tube   ELEVATED D-DIMER   4/3 cta ch no pe   Elevated d-dimr in setting of covid and past ho pe   4/5/2021 v duplex negv   Changed  lovenox to 40 q 12 h (4/4)   COPD  On duoneb symbicort   4/4/2021 Added spiriva     TIME SPENT   Over 25 minutes aggregate care time spent on encounter; activities included   direct patient care, counseling and/or coordinating care reviewing notes, lab data/ imaging , discussion with multidisciplinary team/ patient  /family and explaining in detail risks, benefits, alternatives  of the recommendations     CANDICE MEJIAS 40 [id number] 4/3/2021 DR DIANE TREJO

## 2021-04-05 NOTE — CONSULT NOTE ADULT - ASSESSMENT
39 y/o Male PMH prior PE, prior TB presents with c/o trouble breathing.  Patient states he was previously hospitalized x 3 weeks ago for COVID+ diagnosis  CTA showed a large right tension pneumothorax with atelectasis (I personally reviewed)   CXR after chest tube placed showed reexpansion of right lung with the chest tube in place (I personally reviewed)   Labs reviewed and appropriate    COVID positive on 3/20 and now again 4/4 which is likely just residual viral shedding->likely not infectious at this point   COVID spike protein ab positive   No fevers and O2 requirement likely in the setting of pneumothorax and extensive right sided atelectasis   No role for remdesivir in this case and likely not steroids as well.     Suggest-    ------INCOMPLETE NOTE- RECOMMENDATIONS TO FOLLOW---    39 y/o Male PMH prior PE, prior TB presents with c/o trouble breathing.  Patient states he was previously hospitalized x 3 weeks ago for COVID+ diagnosis  CTA showed a large right tension pneumothorax with atelectasis (I personally reviewed)   CXR after chest tube placed showed reexpansion of right lung with the chest tube in place (I personally reviewed)   Labs reviewed and appropriate    COVID positive on 3/20 and now again 4/4 which is likely just residual viral shedding->likely not infectious at this point   COVID spike protein ab positive   No fevers and O2 requirement likely in the setting of pneumothorax and extensive right sided atelectasis   No role for remdesivir in this case and likely not steroids as well.   patient has blebs and some degree of emphysema which likely put him at increased risk of PTX in the setting of covid   Low suspicion for TB no need to place in negative pressure room or to rule out TB     Suggest-  No need for antibiotics   No need RDV or steroids   chest tube management per thoracic surgery   daily CXR   Send HIV if patient agreement     D/W Dr. Baljit Angel, DO  Infectious Disease Attending  Pager [phone]  After 5pm/weekends please call [phone] for all inquiries and new consults

## 2021-04-05 NOTE — PROGRESS NOTE ADULT - SUBJECTIVE AND OBJECTIVE BOX
RANDELL HARVEY    LVS 2C [address] D    Patient is a 40y old  Male who presents with a chief complaint of SOB from right sided PTX. (04 Apr 2021 21:40)       Allergies    No Known Allergies    Intolerances        HPI:  39 y/o Male PMH prior PE, prior TB presents with c/o trouble breathing.  Patient states he was previously hospitalized x 3 weeks ago for COVID+ diagnosis. Patient  states he'd had trouble breathing during hospitalization, was discharged home about 10 days ago.     Pt came in today after his primary physician demanding he come in for evaluation. CTA confirmed large right PTX, no PE.  (03 Apr 2021 19:22)      PAST MEDICAL & SURGICAL HISTORY:  PE (pulmonary thromboembolism)    Tuberculosis    No significant past surgical history        FAMILY HISTORY:  No pertinent family history in first degree relatives          MEDICATIONS   ALBUTerol    90 MICROgram(s) HFA Inhaler 2 Puff(s) Inhalation every 6 hours  budesonide 160 MICROgram(s)/formoterol 4.5 MICROgram(s) Inhaler 2 Puff(s) Inhalation two times a day  enoxaparin Injectable 40 milliGRAM(s) SubCutaneous every 12 hours  tiotropium 18 MICROgram(s) Capsule 1 Capsule(s) Inhalation daily      Vital Signs Last 24 Hrs  T(C): 36.7 (05 Apr 2021 05:25), Max: 36.8 (05 Apr 2021 00:04)  T(F): 98.1 (05 Apr 2021 05:25), Max: 98.2 (05 Apr 2021 00:04)  HR: 77 (05 Apr 2021 05:25) (77 - 89)  BP: 114/75 (05 Apr 2021 05:25) (114/75 - 136/84)  BP(mean): --  RR: 18 (05 Apr 2021 05:25) (18 - 18)  SpO2: 99% (05 Apr 2021 05:25) (98% - 100%)      04-04-21 @ 07:01  -  04-05-21 @ 07:00  --------------------------------------------------------  IN: 300 mL / OUT: 2190 mL / NET: -1890 mL            LABS:                        16.2   6.86  )-----------( 263      ( 03 Apr 2021 16:12 )             49.0     04-04    140  |  103  |  11  ----------------------------<  85  3.9   |  29  |  1.12    Ca    9.5      04 Apr 2021 10:22  Phos  4.0     04-04  Mg     2.1     04-04    TPro  7.0  /  Alb  2.8<L>  /  TBili  0.6  /  DBili  x   /  AST  27  /  ALT  41  /  AlkPhos  47  04-03    PT/INR - ( 03 Apr 2021 16:12 )   PT: 15.2 sec;   INR: 1.33 ratio         PTT - ( 03 Apr 2021 16:12 )  PTT:34.1 sec      ABG - ( 03 Apr 2021 16:22 )  pH, Arterial: x     pH, Blood: 7.46  /  pCO2: 39    /  pO2: 64    / HCO3: 27    / Base Excess: 3.8   /  SaO2: 92                  WBC:  WBC Count: 6.86 K/uL (04-03 @ 16:12)      MICROBIOLOGY:  RECENT CULTURES:        CARDIAC MARKERS ( 03 Apr 2021 17:06 )  <.015 ng/mL / x     / x     / x     / x            PT/INR - ( 03 Apr 2021 16:12 )   PT: 15.2 sec;   INR: 1.33 ratio         PTT - ( 03 Apr 2021 16:12 )  PTT:34.1 sec    Sodium:  Sodium, Serum: 140 mmol/L (04-04 @ 10:22)  Sodium, Serum: 136 mmol/L (04-03 @ 17:06)      1.12 mg/dL 04-04 @ 10:22  1.16 mg/dL 04-03 @ 17:06      Hemoglobin:  Hemoglobin: 16.2 g/dL (04-03 @ 16:12)      Platelets: Platelet Count - Automated: 263 K/uL (04-03 @ 16:12)      LIVER FUNCTIONS - ( 03 Apr 2021 17:06 )  Alb: 2.8 g/dL / Pro: 7.0 gm/dL / ALK PHOS: 47 U/L / ALT: 41 U/L / AST: 27 U/L / GGT: x                 RADIOLOGY & ADDITIONAL STUDIES:

## 2021-04-06 DIAGNOSIS — M10.9 GOUT, UNSPECIFIED: ICD-10-CM

## 2021-04-06 LAB — HIV 1+2 AB+HIV1 P24 AG SERPL QL IA: SIGNIFICANT CHANGE UP

## 2021-04-06 PROCEDURE — 99232 SBSQ HOSP IP/OBS MODERATE 35: CPT

## 2021-04-06 PROCEDURE — 99233 SBSQ HOSP IP/OBS HIGH 50: CPT

## 2021-04-06 PROCEDURE — 71250 CT THORAX DX C-: CPT | Mod: 26

## 2021-04-06 PROCEDURE — 71045 X-RAY EXAM CHEST 1 VIEW: CPT | Mod: 26

## 2021-04-06 RX ORDER — KETOROLAC TROMETHAMINE 30 MG/ML
30 SYRINGE (ML) INJECTION EVERY 8 HOURS
Refills: 0 | Status: DISCONTINUED | OUTPATIENT
Start: 2021-04-06 | End: 2021-04-09

## 2021-04-06 RX ORDER — ALLOPURINOL 300 MG
300 TABLET ORAL DAILY
Refills: 0 | Status: DISCONTINUED | OUTPATIENT
Start: 2021-04-06 | End: 2021-04-09

## 2021-04-06 RX ORDER — OXYCODONE AND ACETAMINOPHEN 5; 325 MG/1; MG/1
2 TABLET ORAL EVERY 4 HOURS
Refills: 0 | Status: DISCONTINUED | OUTPATIENT
Start: 2021-04-06 | End: 2021-04-09

## 2021-04-06 RX ADMIN — BUDESONIDE AND FORMOTEROL FUMARATE DIHYDRATE 2 PUFF(S): 160; 4.5 AEROSOL RESPIRATORY (INHALATION) at 17:37

## 2021-04-06 RX ADMIN — ALBUTEROL 2 PUFF(S): 90 AEROSOL, METERED ORAL at 05:36

## 2021-04-06 RX ADMIN — Medication 30 MILLIGRAM(S): at 18:48

## 2021-04-06 RX ADMIN — Medication 50 MILLIGRAM(S): at 18:18

## 2021-04-06 RX ADMIN — TIOTROPIUM BROMIDE 1 CAPSULE(S): 18 CAPSULE ORAL; RESPIRATORY (INHALATION) at 11:51

## 2021-04-06 RX ADMIN — ALBUTEROL 2 PUFF(S): 90 AEROSOL, METERED ORAL at 11:52

## 2021-04-06 RX ADMIN — Medication 300 MILLIGRAM(S): at 18:18

## 2021-04-06 RX ADMIN — Medication 30 MILLIGRAM(S): at 17:40

## 2021-04-06 RX ADMIN — ALBUTEROL 2 PUFF(S): 90 AEROSOL, METERED ORAL at 00:09

## 2021-04-06 RX ADMIN — ENOXAPARIN SODIUM 40 MILLIGRAM(S): 100 INJECTION SUBCUTANEOUS at 17:37

## 2021-04-06 RX ADMIN — BUDESONIDE AND FORMOTEROL FUMARATE DIHYDRATE 2 PUFF(S): 160; 4.5 AEROSOL RESPIRATORY (INHALATION) at 05:36

## 2021-04-06 RX ADMIN — ENOXAPARIN SODIUM 40 MILLIGRAM(S): 100 INJECTION SUBCUTANEOUS at 05:36

## 2021-04-06 RX ADMIN — ALBUTEROL 2 PUFF(S): 90 AEROSOL, METERED ORAL at 17:46

## 2021-04-06 NOTE — PROGRESS NOTE ADULT - SUBJECTIVE AND OBJECTIVE BOX
Pulmonary/Critical Care Followup    PAST MEDICAL & SURGICAL HISTORY:  PE (pulmonary thromboembolism)    Tuberculosis    No significant past surgical history        Allergies    No Known Allergies    Intolerances        FAMILY HISTORY:  No pertinent family history in first degree relatives        SOCIAL HISTORY:      MEDICATIONS  (STANDING):  ALBUTerol    90 MICROgram(s) HFA Inhaler 2 Puff(s) Inhalation every 6 hours  budesonide 160 MICROgram(s)/formoterol 4.5 MICROgram(s) Inhaler 2 Puff(s) Inhalation two times a day  enoxaparin Injectable 40 milliGRAM(s) SubCutaneous every 12 hours  tiotropium 18 MICROgram(s) Capsule 1 Capsule(s) Inhalation daily    MEDICATIONS  (PRN):      Vital Signs Last 24 Hrs  T(C): 36.7 (06 Apr 2021 05:47), Max: 36.9 (05 Apr 2021 15:19)  T(F): 98.1 (06 Apr 2021 05:47), Max: 98.5 (05 Apr 2021 15:19)  HR: 74 (06 Apr 2021 05:47) (74 - 90)  BP: 129/85 (06 Apr 2021 05:47) (111/50 - 129/85)  BP(mean): --  RR: 17 (06 Apr 2021 05:47) (17 - 18)  SpO2: 95% (06 Apr 2021 05:47) (95% - 99%)    LABS:                        14.0   4.41  )-----------( 209      ( 05 Apr 2021 09:10 )             42.7     04-05    138  |  102  |  9   ----------------------------<  91  4.0   |  29  |  1.05    Ca    9.3      05 Apr 2021 09:10  Phos  3.0     04-05  Mg     2.1     04-05    TPro  7.3  /  Alb  2.8<L>  /  TBili  0.6  /  DBili  x   /  AST  21  /  ALT  34  /  AlkPhos  45  04-05    PT/INR - ( 05 Apr 2021 09:10 )   PT: 15.8 sec;   INR: 1.38 ratio                   WBC:  WBC Count: 4.41 K/uL (04-05 @ 09:10)  WBC Count: 6.86 K/uL (04-03 @ 16:12)      MICROBIOLOGY:  RECENT CULTURES:              PT/INR - ( 05 Apr 2021 09:10 )   PT: 15.8 sec;   INR: 1.38 ratio             Sodium:  Sodium, Serum: 138 mmol/L (04-05 @ 09:10)  Sodium, Serum: 140 mmol/L (04-04 @ 10:22)  Sodium, Serum: 136 mmol/L (04-03 @ 17:06)      1.05 mg/dL 04-05 @ 09:10  1.12 mg/dL 04-04 @ 10:22  1.16 mg/dL 04-03 @ 17:06      Hemoglobin:  Hemoglobin: 14.0 g/dL (04-05 @ 09:10)  Hemoglobin: 16.2 g/dL (04-03 @ 16:12)      Platelets: Platelet Count - Automated: 209 K/uL (04-05 @ 09:10)  Platelet Count - Automated: 263 K/uL (04-03 @ 16:12)      LIVER FUNCTIONS - ( 05 Apr 2021 09:10 )  Alb: 2.8 g/dL / Pro: 7.3 gm/dL / ALK PHOS: 45 U/L / ALT: 34 U/L / AST: 21 U/L / GGT: x                 RADIOLOGY & ADDITIONAL STUDIES:

## 2021-04-06 NOTE — PROGRESS NOTE ADULT - ASSESSMENT
Pt has extensive bullous/pneumoatocele from COVID19 disease.    Would not be in a hurry to DC or Clamp the chest tube.    Recommend DC suction in am and repeating CXR in 4- 6 hours off suction.    If CXR shows the lung to stay inflated, leave off suction for 24 - 48 hours.

## 2021-04-06 NOTE — PROGRESS NOTE ADULT - SUBJECTIVE AND OBJECTIVE BOX
SUBJECTIVE: Patient seen and examined at bedside, patient without complaints.   Denies any shortness of breath at this time. He is s/p right pigtail placement by ED on 4/3        MEDICATIONS  (STANDING):  ALBUTerol    90 MICROgram(s) HFA Inhaler 2 Puff(s) Inhalation every 6 hours  budesonide 160 MICROgram(s)/formoterol 4.5 MICROgram(s) Inhaler 2 Puff(s) Inhalation two times a day  enoxaparin Injectable 40 milliGRAM(s) SubCutaneous every 12 hours  tiotropium 18 MICROgram(s) Capsule 1 Capsule(s) Inhalation daily    Vital Signs Last 24 Hrs  T(C): 36.8 (06 Apr 2021 10:45), Max: 36.9 (05 Apr 2021 15:19)  T(F): 98.2 (06 Apr 2021 10:45), Max: 98.5 (05 Apr 2021 15:19)  HR: 96 (06 Apr 2021 10:45) (74 - 96)  BP: 117/78 (06 Apr 2021 10:45) (113/69 - 129/85)  RR: 17 (06 Apr 2021 10:45) (17 - 18)  SpO2: 96% (06 Apr 2021 10:45) (95% - 99%)    GENERAL: Alert, NAD  CHEST/LUNG:  respirations nonlabored, Right pigtail to LWS with serous drainage, on RA.   HEART: Regular rate and rhythm;   ABDOMEN: + Bowel sounds, soft, Nontender, Nondistended  EXTREMITIES:  no calf tenderness, No edema      I&O's Detail    05 Apr 2021 07:01  -  06 Apr 2021 07:00  --------------------------------------------------------  IN:  Total IN: 0 mL    OUT:    Chest Tube (mL): 45 mL    Voided (mL): 600 mL  Total OUT: 645 mL    Total NET: -645 mL      06 Apr 2021 07:01  -  06 Apr 2021 11:35  --------------------------------------------------------  IN:    Oral Fluid: 360 mL  Total IN: 360 mL    OUT:  Total OUT: 0 mL    Total NET: 360 mL          LABS:                        14.0   4.41  )-----------( 209      ( 05 Apr 2021 09:10 )             42.7     04-05    138  |  102  |  9   ----------------------------<  91  4.0   |  29  |  1.05    Ca    9.3      05 Apr 2021 09:10  Phos  3.0     04-05  Mg     2.1     04-05    TPro  7.3  /  Alb  2.8<L>  /  TBili  0.6  /  DBili  x   /  AST  21  /  ALT  34  /  AlkPhos  45  04-05    PT/INR - ( 05 Apr 2021 09:10 )   PT: 15.8 sec;   INR: 1.38 ratio         A/P  41 YO M with reent hospitalization for covid, now readmitted with right sided pneumothorax, pigtail in place, to LWS.  Output in 24 hours 45cc, serous drainage.  Pending repeat Chest Xray.  Continue CT to LWS  Will discuss with Dr. Reyes

## 2021-04-06 NOTE — PROGRESS NOTE ADULT - SUBJECTIVE AND OBJECTIVE BOX
HPI:  41 y/o Male PMH prior PE, prior TB presents with c/o trouble breathing.  Patient states he was previously hospitalized x 3 weeks ago for COVID+ diagnosis. Patient  states he'd had trouble breathing during hospitalization, was discharged home about 10 days ago.     Pt came in today after his primary physician demanding he come in for evaluation. CTA confirmed large right PTX, no PE.  (03 Apr 2021 19:22)    Patient is a 40y old  Male who presents with a chief complaint of SOB from right sided PTX. (06 Apr 2021 15:49)      INTERVAL HPI/OVERNIGHT EVENTS: no acute events complaining of gout pain     MEDICATIONS  (STANDING):  ALBUTerol    90 MICROgram(s) HFA Inhaler 2 Puff(s) Inhalation every 6 hours  allopurinol 300 milliGRAM(s) Oral daily  budesonide 160 MICROgram(s)/formoterol 4.5 MICROgram(s) Inhaler 2 Puff(s) Inhalation two times a day  enoxaparin Injectable 40 milliGRAM(s) SubCutaneous every 12 hours  predniSONE   Tablet 50 milliGRAM(s) Oral daily  tiotropium 18 MICROgram(s) Capsule 1 Capsule(s) Inhalation daily    MEDICATIONS  (PRN):  ketorolac   Injectable 30 milliGRAM(s) IV Push every 8 hours PRN Moderate Pain (4 - 6)      Allergies    No Known Allergies    Intolerances        REVIEW OF SYSTEMS:  CONSTITUTIONAL: No fever, weight loss, or fatigue  EYES: No eye pain, visual disturbances, or discharge  ENMT:  No difficulty hearing, tinnitus, vertigo; No sinus or throat pain  NECK: No pain or stiffness  BREASTS: No pain, masses, or nipple discharge  RESPIRATORY: No cough, wheezing, chills or hemoptysis; No shortness of breath  CARDIOVASCULAR: No chest pain, palpitations, dizziness, or leg swelling  GASTROINTESTINAL: No abdominal or epigastric pain. No nausea, vomiting, or hematemesis; No diarrhea or constipation. No melena or hematochezia.  GENITOURINARY: No dysuria, frequency, hematuria, or incontinence  NEUROLOGICAL: No headaches, memory loss, loss of strength, numbness, or tremors  SKIN: No itching, burning, rashes, or lesions   LYMPH NODES: No enlarged glands  ENDOCRINE: No heat or cold intolerance; No hair loss  MUSCULOSKELETAL: No joint pain or swelling; No muscle, back, or extremity pain  PSYCHIATRIC: No depression, anxiety, mood swings, or difficulty sleeping  HEME/LYMPH: No easy bruising, or bleeding gums  ALLERGY AND IMMUNOLOGIC: No hives or eczema    Vital Signs Last 24 Hrs  T(C): 37.4 (06 Apr 2021 15:08), Max: 37.4 (06 Apr 2021 15:08)  T(F): 99.3 (06 Apr 2021 15:08), Max: 99.3 (06 Apr 2021 15:08)  HR: 98 (06 Apr 2021 15:08) (74 - 98)  BP: 131/88 (06 Apr 2021 15:08) (117/78 - 131/88)  RR: 16 (06 Apr 2021 17:40) (16 - 17)  SpO2: 96% (06 Apr 2021 17:40) (94% - 99%)    PHYSICAL EXAM:  GENERAL: NAD, well-groomed, well-developed  HEAD:  Atraumatic, Normocephalic  EYES: EOMI, PERRLA, conjunctiva and sclera clear  ENMT: No tonsillar erythema, exudates, or enlargement; Moist mucous membranes, Good dentition, No lesions  NECK: Supple, No JVD, Normal thyroid  NERVOUS SYSTEM:  Alert & Oriented X3, Good concentration; Motor Strength 5/5 B/L upper and lower extremities; DTRs 2+ intact and symmetric  CHEST/LUNG: Clear to percussion bilaterally; No rales, rhonchi, wheezing, or rubs  HEART: Regular rate and rhythm; No murmurs, rubs, or gallops  ABDOMEN: Soft, Nontender, Nondistended; Bowel sounds present  EXTREMITIES:  2+ Peripheral Pulses, No clubbing, cyanosis, or edema  LYMPH: No lymphadenopathy noted  SKIN: No rashes or lesions    LABS:                        14.0   4.41  )-----------( 209      ( 05 Apr 2021 09:10 )             42.7     04-05    138  |  102  |  9   ----------------------------<  91  4.0   |  29  |  1.05    Ca    9.3      05 Apr 2021 09:10  Phos  3.0     04-05  Mg     2.1     04-05    TPro  7.3  /  Alb  2.8<L>  /  TBili  0.6  /  DBili  x   /  AST  21  /  ALT  34  /  AlkPhos  45  04-05    PT/INR - ( 05 Apr 2021 09:10 )   PT: 15.8 sec;   INR: 1.38 ratio             CAPILLARY BLOOD GLUCOSE          RADIOLOGY & ADDITIONAL TESTS:  < from: Xray Chest 1 View- PORTABLE-Urgent (Xray Chest 1 View- PORTABLE-Urgent .) (04.06.21 @ 11:45) >    EXAM:  XR CHEST PORTABLE URGENT 1V                            PROCEDURE DATE:  04/06/2021          INTERPRETATION:  AP semierect chest on April 6, 2021 at 10:47 AM. Patient is being followed for right chest tube.    Heart size is within normal limits. Catheter right chest tube remains.    There is a persistent small right base pleural pulmonary reaction. No pneumothorax.    Chest is similar to April 5.    IMPRESSION: Unchanged chest as above.  < from: US Duplex Venous Lower Ext Complete, Bilateral (04.05.21 @ 11:30) >  EXAM:  US DPLX LWR EXT VEINS COMPL BI                            PROCEDURE DATE:  04/05/2021          INTERPRETATION:  CLINICAL INFORMATION: Elevated d-dimer; prior history of COVID and pulmonary embolism.    COMPARISON: None available.    TECHNIQUE: Duplex sonography of the BILATERAL LOWER extremity veins with color and spectral Doppler, with and without compression.    FINDINGS:    RIGHT:  Normal compressibility of the RIGHT common femoral, femoral and popliteal veins.  Doppler examination shows normal spontaneous and phasic flow.  No RIGHT calf vein thrombosis is detected.    LEFT:  Normal compressibility of the LEFT common femoral, femoral and popliteal veins.  Doppler examination shows normal spontaneous and phasic flow.  No LEFT calf vein thrombosis is detected.    IMPRESSION:    No evidence of deep venous thrombosis in either lower extremity.    < end of copied text >      Imaging Personally Reviewed:  [ X] YES  [ ] NO    Consultant(s) Notes Reviewed:  [ X] YES  [ ] NO    Care Discussed with Consultants/Other Providers [X ] YES  [ ] NO

## 2021-04-06 NOTE — PROGRESS NOTE ADULT - ASSESSMENT
HARVEY RANDELL 40 1980 4/3/2021 DR DIANE TREJO           REVIEW OF SYMPTOMS      Able to give ROS  Yes     RELIABLE +/-   CONSTITUTIONAL Weakness Yes  Chills No   ENDOCRINE  No heat or cold intolerance    ALLERGY No hives  Sore throat No stridor  RESP Coughing blood no  Shortness of breath YES   NEURO No Headache  Confusion Pain neck No   CARDIAC No Chest pain No Palpitations   GI  Pain abdomen NO   Vomiting NO     PHYSICAL EXAM    HEENT Unremarkable  atraumatic   RESP Fair air entry EXP prolonged    Harsh breath sound Resp distres mild   CARDIAC S1 S2 No S3     NO JVD    ABDOMEN SOFT BS PRESENT NOT DISTENDED No hepatosplenomegaly PEDAL EDEMA present No calf tenderness  NO rash       PATIENT SUMMARY  40 m former smoker quit 1 p 1 y ago past ho tb ho pulmonary embolism admitted 4/3 with large r pneumothorax He hd been rxed for covid 3 w ago admitted 3/21-3/24 dced home after observtion No rdv or dex given   Pt readmitted 4/3 with dysonea and was found to have R pntx   R pigtail was placed 4/3 with reexpansion of lung  cts on case   His scv2 was negv 4/3 but posv 4/4   Pulm consulted 4/4/2021       PROBLEMS.  SCV2 POSV 4/4/2021   SCV2 ADMISSION 3/21-3/24  LARGE R PNEUMOTHORAX 4/3 CT [facility]   PIGTAIL 4/3   ELEVATED D-DIMR 4/3 d-d 464   NSVT EPISODE 4/4  REMOTE HO ACTIVE TB  PAST HO PE       GLOBAL AND BEST PRACTICE ISSUES                     ALLERGY.  nka    WEIGHT.            4/4/2021 84k                    BMI.                  4/4/2021 25      ADVANCED DIRECTIVE.                               HEAD OF BED ELEVATION. Yes  DVT PROPHYLAXIS.   lvnx 40 (4/3)                    MEAD PROPHYLAXIS.   APA.                                                                    DYSPHAGIA RECOMM.   DIET.    REG (4/3)   INFECTION PROPHYLAXIS.   FREE WATER.      ASSESSMENT/RECOMMENDATIONS.    COVID   Monitor markers  Monitor oxygenation  Oxygen supplementation as needed to keep pulse ox 90-95%  ID consult 4/5/2021 appreciated No RDV or DEX planned as hypoxia likely sec to pntx and scv2 pcr  detected is likely inactive virus  He had scv2 admission 3/21   PNEUMOTHORAX SPONTANEOUS IN SETTING OF COVID   Has chest tube   Is being followed by cts   Monitor closely DC chest tube once airleak stops and pt tolerates clamping chets tube   ELEVATED D-DIMER   4/5/2021 v duplex negv   4/3 cta ch no pe   Elevated d-dimr in setting of covid and past ho pe   Changed  lovenox to 40 q 12 h (4/4)   COPD  On duoneb symbicort   4/4/2021 Added spiriva   HO TB   Treated several years ago     TIME SPENT   Over 25 minutes aggregate care time spent on encounter; activities included   direct patient care, counseling and/or coordinating care reviewing notes, lab data/ imaging , discussion with multidisciplinary team/ patient  /family and explaining in detail risks, benefits, alternatives  of the recommendations     CANDICE MEJIAS 40 1980 4/3/2021 DR DIANE TREJO     sibling(s)/friend

## 2021-04-06 NOTE — PROGRESS NOTE ADULT - ASSESSMENT
41 y/o Male PMH PE, TB presents with c/o trouble breathing. Patient states he was previously hospitalized x 3 weeks after COVID+ diagnosis. Patient  states he'd had trouble breathing during hospitalization but upon discharge he noticed his difficulty breathing. Pt came in today after avoiding to come in, due to his primary physician demanding he come in for evaluation. CTA confirmed large right PTX, no PE.     Pulmonary:  Acute right sided PTX with recent COVID pneumonia. ER placed chest tube. No PE on CTA. Likely has undiagnosed COPD as bullae and emphysema seen on CTA. Smokes cigarettes and other substances for over 20 years.   COVID negative in ER now COVID + .     Doing much better chest tube on waterseal   gout attack     DC planning once tube is out

## 2021-04-06 NOTE — PROGRESS NOTE ADULT - ASSESSMENT
39 y/o Male PMH prior PE, prior TB presents with c/o trouble breathing.  Patient states he was previously hospitalized x 3 weeks ago for COVID+ diagnosis  CTA showed a large right tension pneumothorax with atelectasis (I personally reviewed)   CXR after chest tube placed showed reexpansion of right lung with the chest tube in place (I personally reviewed)   Labs reviewed and appropriate    COVID positive on 3/20 and now again 4/4 which is likely just residual viral shedding->likely not infectious at this point   COVID spike protein ab positive   No fevers and O2 requirement likely in the setting of pneumothorax and extensive right sided atelectasis   No role for remdesivir in this case and likely not steroids as well.   patient has blebs and some degree of emphysema which likely put him at increased risk of PTX in the setting of covid   Low suspicion for TB no need to place in negative pressure room or to rule out TB     4/6: Doing well on RA, awaiting for decision for chest tube removal, HIV negative     Suggest-  No need for antibiotics   No need RDV or steroids   chest tube management per thoracic surgery   daily CXR   HIV negative     D/W thoracic surgery PA  Will sign off. Please call with questions    Georgi Angel,   Infectious Disease Attending  Pager [phone]  After 5pm/weekends please call [phone] for all inquiries and new consul 39 y/o Male PMH prior PE, prior TB presents with c/o trouble breathing.  Patient states he was previously hospitalized x 3 weeks ago for COVID+ diagnosis  CTA showed a large right tension pneumothorax with atelectasis (I personally reviewed)   CXR after chest tube placed showed reexpansion of right lung with the chest tube in place (I personally reviewed)   Labs reviewed and appropriate    COVID positive on 3/20 and now again 4/4 which is likely just residual viral shedding->likely not infectious at this point   COVID spike protein ab positive   No fevers and O2 requirement likely in the setting of pneumothorax and extensive right sided atelectasis   No role for remdesivir in this case and likely not steroids as well.   patient has blebs and some degree of emphysema which likely put him at increased risk of PTX in the setting of covid   Low suspicion for TB no need to place in negative pressure room or to rule out TB     4/6: Doing well on RA, awaiting for decision for chest tube removal, HIV negative, likely experiencing acute gout flare of great toe    Suggest-  #Pneumothorax/History of COVID and Hx of TB  No need for antibiotics   No need RDV or steroids   chest tube management per thoracic surgery   daily CXR   HIV negative       #Acute Gout Flare  -management per medicine  -no ID contraindication for starting steroids    D/W thoracic surgery PA  Will sign off. Please call with questions    Georgi Angel DO  Infectious Disease Attending  Pager [phone]  After 5pm/weekends please call [phone] for all inquiries and new consul

## 2021-04-07 DIAGNOSIS — Z29.9 ENCOUNTER FOR PROPHYLACTIC MEASURES, UNSPECIFIED: ICD-10-CM

## 2021-04-07 DIAGNOSIS — J44.9 CHRONIC OBSTRUCTIVE PULMONARY DISEASE, UNSPECIFIED: ICD-10-CM

## 2021-04-07 LAB
24R-OH-CALCIDIOL SERPL-MCNC: 21.8 NG/ML — LOW (ref 30–80)
ALBUMIN SERPL ELPH-MCNC: 3.1 G/DL — LOW (ref 3.3–5)
ALP SERPL-CCNC: 49 U/L — SIGNIFICANT CHANGE UP (ref 40–120)
ALT FLD-CCNC: 45 U/L — SIGNIFICANT CHANGE UP (ref 12–78)
ANION GAP SERPL CALC-SCNC: 7 MMOL/L — SIGNIFICANT CHANGE UP (ref 5–17)
AST SERPL-CCNC: 20 U/L — SIGNIFICANT CHANGE UP (ref 15–37)
BILIRUB SERPL-MCNC: 0.6 MG/DL — SIGNIFICANT CHANGE UP (ref 0.2–1.2)
BUN SERPL-MCNC: 14 MG/DL — SIGNIFICANT CHANGE UP (ref 7–23)
CALCIUM SERPL-MCNC: 9.9 MG/DL — SIGNIFICANT CHANGE UP (ref 8.5–10.1)
CHLORIDE SERPL-SCNC: 100 MMOL/L — SIGNIFICANT CHANGE UP (ref 96–108)
CO2 SERPL-SCNC: 26 MMOL/L — SIGNIFICANT CHANGE UP (ref 22–31)
CREAT SERPL-MCNC: 1.07 MG/DL — SIGNIFICANT CHANGE UP (ref 0.5–1.3)
GLUCOSE SERPL-MCNC: 118 MG/DL — HIGH (ref 70–99)
HCT VFR BLD CALC: 44.7 % — SIGNIFICANT CHANGE UP (ref 39–50)
HGB BLD-MCNC: 15 G/DL — SIGNIFICANT CHANGE UP (ref 13–17)
MAGNESIUM SERPL-MCNC: 1.9 MG/DL — SIGNIFICANT CHANGE UP (ref 1.6–2.6)
MCHC RBC-ENTMCNC: 29 PG — SIGNIFICANT CHANGE UP (ref 27–34)
MCHC RBC-ENTMCNC: 33.6 GM/DL — SIGNIFICANT CHANGE UP (ref 32–36)
MCV RBC AUTO: 86.3 FL — SIGNIFICANT CHANGE UP (ref 80–100)
NRBC # BLD: 0 /100 WBCS — SIGNIFICANT CHANGE UP (ref 0–0)
PHOSPHATE SERPL-MCNC: 2.6 MG/DL — SIGNIFICANT CHANGE UP (ref 2.5–4.5)
PLATELET # BLD AUTO: 213 K/UL — SIGNIFICANT CHANGE UP (ref 150–400)
POTASSIUM SERPL-MCNC: 4.2 MMOL/L — SIGNIFICANT CHANGE UP (ref 3.5–5.3)
POTASSIUM SERPL-SCNC: 4.2 MMOL/L — SIGNIFICANT CHANGE UP (ref 3.5–5.3)
PROT SERPL-MCNC: 7.9 GM/DL — SIGNIFICANT CHANGE UP (ref 6–8.3)
RBC # BLD: 5.18 M/UL — SIGNIFICANT CHANGE UP (ref 4.2–5.8)
RBC # FLD: 12.6 % — SIGNIFICANT CHANGE UP (ref 10.3–14.5)
SODIUM SERPL-SCNC: 133 MMOL/L — LOW (ref 135–145)
URATE SERPL-MCNC: 5.5 MG/DL — SIGNIFICANT CHANGE UP (ref 3.4–8.8)
VIT D25+D1,25 OH+D1,25 PNL SERPL-MCNC: 47.6 PG/ML — SIGNIFICANT CHANGE UP (ref 19.9–79.3)
WBC # BLD: 5.87 K/UL — SIGNIFICANT CHANGE UP (ref 3.8–10.5)
WBC # FLD AUTO: 5.87 K/UL — SIGNIFICANT CHANGE UP (ref 3.8–10.5)

## 2021-04-07 PROCEDURE — 99233 SBSQ HOSP IP/OBS HIGH 50: CPT

## 2021-04-07 PROCEDURE — 99232 SBSQ HOSP IP/OBS MODERATE 35: CPT

## 2021-04-07 PROCEDURE — 71045 X-RAY EXAM CHEST 1 VIEW: CPT | Mod: 26

## 2021-04-07 RX ORDER — CHLORPROMAZINE HCL 10 MG
25 TABLET ORAL ONCE
Refills: 0 | Status: COMPLETED | OUTPATIENT
Start: 2021-04-07 | End: 2021-04-07

## 2021-04-07 RX ADMIN — TIOTROPIUM BROMIDE 1 CAPSULE(S): 18 CAPSULE ORAL; RESPIRATORY (INHALATION) at 11:54

## 2021-04-07 RX ADMIN — ALBUTEROL 2 PUFF(S): 90 AEROSOL, METERED ORAL at 17:50

## 2021-04-07 RX ADMIN — ENOXAPARIN SODIUM 40 MILLIGRAM(S): 100 INJECTION SUBCUTANEOUS at 05:41

## 2021-04-07 RX ADMIN — ENOXAPARIN SODIUM 40 MILLIGRAM(S): 100 INJECTION SUBCUTANEOUS at 17:47

## 2021-04-07 RX ADMIN — Medication 300 MILLIGRAM(S): at 11:54

## 2021-04-07 RX ADMIN — ALBUTEROL 2 PUFF(S): 90 AEROSOL, METERED ORAL at 05:43

## 2021-04-07 RX ADMIN — ALBUTEROL 2 PUFF(S): 90 AEROSOL, METERED ORAL at 00:07

## 2021-04-07 RX ADMIN — BUDESONIDE AND FORMOTEROL FUMARATE DIHYDRATE 2 PUFF(S): 160; 4.5 AEROSOL RESPIRATORY (INHALATION) at 17:47

## 2021-04-07 RX ADMIN — Medication 25 MILLIGRAM(S): at 21:27

## 2021-04-07 RX ADMIN — ALBUTEROL 2 PUFF(S): 90 AEROSOL, METERED ORAL at 23:41

## 2021-04-07 RX ADMIN — Medication 50 MILLIGRAM(S): at 05:42

## 2021-04-07 RX ADMIN — ALBUTEROL 2 PUFF(S): 90 AEROSOL, METERED ORAL at 11:59

## 2021-04-07 RX ADMIN — BUDESONIDE AND FORMOTEROL FUMARATE DIHYDRATE 2 PUFF(S): 160; 4.5 AEROSOL RESPIRATORY (INHALATION) at 05:43

## 2021-04-07 NOTE — PROGRESS NOTE ADULT - ASSESSMENT
A/P  39 YO M with recent hospitalization for covid, now readmitted with right sided pneumothorax, pigtail in place to waterseal.  Repeat CT shows resolved pneumothorax, with underlying multiple blebs and bulla.  Will continue to monitor off suction.  Repeat Chest Xray in AM  If CXR shows the lung to stay inflated, leave off suction for 24 - 48 hours.

## 2021-04-07 NOTE — PROGRESS NOTE ADULT - SUBJECTIVE AND OBJECTIVE BOX
Patient is a 40y old  Male who presents with a chief complaint of SOB from right sided PTX. (07 Apr 2021 14:36)      INTERVAL HPI/ OVERNIGHT EVENTS: Pt was seen and examined at bedside today, No significant overnight events, pt denies any SOB and CP      MEDICATIONS  (STANDING):  ALBUTerol    90 MICROgram(s) HFA Inhaler 2 Puff(s) Inhalation every 6 hours  allopurinol 300 milliGRAM(s) Oral daily  budesonide 160 MICROgram(s)/formoterol 4.5 MICROgram(s) Inhaler 2 Puff(s) Inhalation two times a day  enoxaparin Injectable 40 milliGRAM(s) SubCutaneous every 12 hours  predniSONE   Tablet 50 milliGRAM(s) Oral daily  tiotropium 18 MICROgram(s) Capsule 1 Capsule(s) Inhalation daily    MEDICATIONS  (PRN):  ketorolac   Injectable 30 milliGRAM(s) IV Push every 8 hours PRN Moderate Pain (4 - 6)  oxycodone    5 mG/acetaminophen 325 mG 2 Tablet(s) Oral every 4 hours PRN Mild Pain (1 - 3)      Allergies    No Known Allergies    Intolerances        REVIEW OF SYSTEMS:    Unable to examine due to [ ] Encephalopathy [ ] Advanced Dementia [ ] Expressive Aphasia [ ] Non-verbal patient    CONSTITUTIONAL: No fever, NO generalized weakness/Fatigue, No weight loss  EYES: No eye pain, visual disturbances, or discharge  ENMT:  No difficulty hearing, tinnitus, vertigo; No sinus or throat pain  NECK: No pain or stiffness  RESPIRATORY: No shortness of breath,  cough, wheezing, sputum or hemoptysis   CARDIOVASCULAR: No chest pain, palpitations, or leg swelling  GASTROINTESTINAL: No abdominal pain. No nausea, vomiting, diarrhea or constipation. No melena or hematochezia.  GENITOURINARY: No dysuria, frequency, hematuria, or incontinence  NEUROLOGICAL: No headaches, Dizziness, memory loss, loss of strength, numbness, or tremors  SKIN: No itching, burning, rashes, or lesions   MUSCULOSKELETAL: No joint pain or swelling; No muscle, back, or extremity pain  PSYCHIATRIC: No depression, anxiety, mood swings, or difficulty sleeping  HEME/LYMPH: No easy bruising, or bleeding gums      Vital Signs Last 24 Hrs  T(C): 37 (07 Apr 2021 15:19), Max: 37 (07 Apr 2021 15:19)  T(F): 98.6 (07 Apr 2021 15:19), Max: 98.6 (07 Apr 2021 15:19)  HR: 92 (07 Apr 2021 15:19) (79 - 103)  BP: 133/83 (07 Apr 2021 15:19) (125/73 - 133/83)  BP(mean): --  RR: 17 (07 Apr 2021 15:19) (16 - 17)  SpO2: 97% (07 Apr 2021 15:19) (96% - 99%)    PHYSICAL EXAM:  GENERAL: NAD, well-developed, well-groomed  HEAD:  Atraumatic, Normocephalic  EYES: conjunctiva and sclera clear  ENMT: Moist mucous membranes  NECK: Supple, No JVD, Normal thyroid  CHEST/LUNG: Clear to Auscultation bilaterally; No rales, rhonchi, wheezing, or rubs  HEART: Regular rate and rhythm; No murmurs, rubs, or gallops  ABDOMEN: Soft, Nontender, Nondistended; Bowel sounds present  EXTREMITIES:  2+ Peripheral Pulses, No clubbing, cyanosis, or edema  SKIN: No rashes or lesions  NERVOUS SYSTEM:  Alert & Oriented X3, Good concentration; Motor Strength 5/5 B/L upper and lower extremities    LABS:                        15.0   5.87  )-----------( 213      ( 07 Apr 2021 08:43 )             44.7     04-07    133<L>  |  100  |  14  ----------------------------<  118<H>  4.2   |  26  |  1.07    Ca    9.9      07 Apr 2021 08:43  Phos  2.6     04-07  Mg     1.9     04-07    TPro  7.9  /  Alb  3.1<L>  /  TBili  0.6  /  DBili  x   /  AST  20  /  ALT  45  /  AlkPhos  49  04-07        CAPILLARY BLOOD GLUCOSE              RADIOLOGY & ADDITIONAL TESTS:          Imaging Personally Reviewed:  [ ] YES  [ ] NO    Consultant(s) Notes Reviewed:  [x ] YES  [ ] NO    Care Discussed with Consultants/Other Providers [x ] YES  [ ] NO

## 2021-04-07 NOTE — PROGRESS NOTE ADULT - ASSESSMENT
CANDICE RANDELL 40 1980 4/3/2021 DR DIANE TREJO           REVIEW OF SYMPTOMS      Able to give ROS  Yes     RELIABLE +/-   CONSTITUTIONAL Weakness Yes  Chills No   ENDOCRINE  No heat or cold intolerance    ALLERGY No hives  Sore throat No stridor  RESP Coughing blood no  Shortness of breath YES   NEURO No Headache  Confusion Pain neck No   CARDIAC No Chest pain No Palpitations   GI  Pain abdomen NO   Vomiting NO     PHYSICAL EXAM    HEENT Unremarkable  atraumatic   RESP Fair air entry EXP prolonged    Harsh breath sound Resp distres mild   CARDIAC S1 S2 No S3     NO JVD    ABDOMEN SOFT BS PRESENT NOT DISTENDED No hepatosplenomegaly PEDAL EDEMA present No calf tenderness  NO rash       PATIENT SUMMARY  40 m former smoker quit 1 p 1 y ago past ho tb ho pulmonary embolism admitted 4/3 with large r pneumothorax He hd been rxed for covid 3 w ago admitted 3/21-3/24 dced home after observtion No rdv or dex given   Pt readmitted 4/3 with dysonea and was found to have R pntx   R pigtail was placed 4/3 with reexpansion of lung  cts on case   His scv2 was negv 4/3 but posv 4/4   Pulm consulted 4/4/2021       PROBLEMS.  SCV2 POSV 4/4/2021   SCV2 ADMISSION 3/21-3/24  LARGE R PNEUMOTHORAX 4/3 CT [facility]   PIGTAIL 4/3   ELEVATED D-DIMR 4/3 d-d 464   NSVT EPISODE 4/4  REMOTE HO ACTIVE TB  PAST HO PE       ASSESSMENT/RECOMMENDATIONS.    COVID   Monitor markers  Monitor oxygenation  Oxygen supplementation as needed to keep pulse ox 90-95%  ID consult 4/5/2021 appreciated No RDV or DEX planned as hypoxia likely sec to pntx and scv2 pcr  detected is likely inactive virus  He had scv2 admission 3/21   PNEUMOTHORAX SPONTANEOUS IN SETTING OF COVID   Has chest tube   Is being followed by cts   Monitor closely DC chest tube once airleak stops and pt tolerates clamping chets tube   ELEVATED D-DIMER   4/5/2021 v duplex negv   4/3 cta ch no pe   Elevated d-dimr in setting of covid and past ho pe   Changed  lovenox to 40 q 12 h (4/4)   COPD  On duoneb symbicort   4/4/2021 Added spiriva   HO TB   Treated several years ago     TIME SPENT   Over 25 minutes aggregate care time spent on encounter; activities included   direct patient care, counseling and/or coordinating care reviewing notes, lab data/ imaging , discussion with multidisciplinary team/ patient  /family and explaining in detail risks, benefits, alternatives  of the recommendations     CANDICE MEJIAS 40 1980 4/3/2021 DR DIANE TREJO

## 2021-04-07 NOTE — PROGRESS NOTE ADULT - SUBJECTIVE AND OBJECTIVE BOX
RANDELL HARVEY    LVS 2C [address] D    Patient is a 40y old  Male who presents with a chief complaint of SOB from right sided PTX. (06 Apr 2021 18:34)       Allergies    No Known Allergies    Intolerances        HPI:  41 y/o Male PMH prior PE, prior TB presents with c/o trouble breathing.  Patient states he was previously hospitalized x 3 weeks ago for COVID+ diagnosis. Patient  states he'd had trouble breathing during hospitalization, was discharged home about 10 days ago.     Pt came in today after his primary physician demanding he come in for evaluation. CTA confirmed large right PTX, no PE.  (03 Apr 2021 19:22)      PAST MEDICAL & SURGICAL HISTORY:  PE (pulmonary thromboembolism)    Tuberculosis    No significant past surgical history        FAMILY HISTORY:  No pertinent family history in first degree relatives          MEDICATIONS   ALBUTerol    90 MICROgram(s) HFA Inhaler 2 Puff(s) Inhalation every 6 hours  allopurinol 300 milliGRAM(s) Oral daily  budesonide 160 MICROgram(s)/formoterol 4.5 MICROgram(s) Inhaler 2 Puff(s) Inhalation two times a day  enoxaparin Injectable 40 milliGRAM(s) SubCutaneous every 12 hours  ketorolac   Injectable 30 milliGRAM(s) IV Push every 8 hours PRN  oxycodone    5 mG/acetaminophen 325 mG 2 Tablet(s) Oral every 4 hours PRN  predniSONE   Tablet 50 milliGRAM(s) Oral daily  tiotropium 18 MICROgram(s) Capsule 1 Capsule(s) Inhalation daily      Vital Signs Last 24 Hrs  T(C): 36.8 (07 Apr 2021 05:23), Max: 37.4 (06 Apr 2021 15:08)  T(F): 98.3 (07 Apr 2021 05:23), Max: 99.3 (06 Apr 2021 15:08)  HR: 86 (07 Apr 2021 05:23) (79 - 98)  BP: 127/80 (07 Apr 2021 05:23) (117/78 - 131/88)  BP(mean): --  RR: 17 (07 Apr 2021 05:23) (16 - 17)  SpO2: 96% (07 Apr 2021 05:23) (94% - 99%)      04-06-21 @ 07:01  -  04-07-21 @ 07:00  --------------------------------------------------------  IN: 360 mL / OUT: 200 mL / NET: 160 mL            LABS:                        14.0   4.41  )-----------( 209      ( 05 Apr 2021 09:10 )             42.7     04-05    138  |  102  |  9   ----------------------------<  91  4.0   |  29  |  1.05    Ca    9.3      05 Apr 2021 09:10  Phos  3.0     04-05  Mg     2.1     04-05    TPro  7.3  /  Alb  2.8<L>  /  TBili  0.6  /  DBili  x   /  AST  21  /  ALT  34  /  AlkPhos  45  04-05    PT/INR - ( 05 Apr 2021 09:10 )   PT: 15.8 sec;   INR: 1.38 ratio                   WBC:  WBC Count: 4.41 K/uL (04-05 @ 09:10)  WBC Count: 6.86 K/uL (04-03 @ 16:12)      MICROBIOLOGY:  RECENT CULTURES:              PT/INR - ( 05 Apr 2021 09:10 )   PT: 15.8 sec;   INR: 1.38 ratio             Sodium:  Sodium, Serum: 138 mmol/L (04-05 @ 09:10)  Sodium, Serum: 140 mmol/L (04-04 @ 10:22)  Sodium, Serum: 136 mmol/L (04-03 @ 17:06)      1.05 mg/dL 04-05 @ 09:10  1.12 mg/dL 04-04 @ 10:22  1.16 mg/dL 04-03 @ 17:06      Hemoglobin:  Hemoglobin: 14.0 g/dL (04-05 @ 09:10)  Hemoglobin: 16.2 g/dL (04-03 @ 16:12)      Platelets: Platelet Count - Automated: 209 K/uL (04-05 @ 09:10)  Platelet Count - Automated: 263 K/uL (04-03 @ 16:12)      LIVER FUNCTIONS - ( 05 Apr 2021 09:10 )  Alb: 2.8 g/dL / Pro: 7.3 gm/dL / ALK PHOS: 45 U/L / ALT: 34 U/L / AST: 21 U/L / GGT: x                 RADIOLOGY & ADDITIONAL STUDIES:

## 2021-04-07 NOTE — PROGRESS NOTE ADULT - SUBJECTIVE AND OBJECTIVE BOX
SUBJECTIVE: Patient seen and examined at bedside, patient without complaints.   Denies any shortness of breath at this time. He is s/p right pigtail placement by ED on 4/3    MEDICATIONS  (STANDING):  ALBUTerol    90 MICROgram(s) HFA Inhaler 2 Puff(s) Inhalation every 6 hours  allopurinol 300 milliGRAM(s) Oral daily  budesonide 160 MICROgram(s)/formoterol 4.5 MICROgram(s) Inhaler 2 Puff(s) Inhalation two times a day  enoxaparin Injectable 40 milliGRAM(s) SubCutaneous every 12 hours  predniSONE   Tablet 50 milliGRAM(s) Oral daily  tiotropium 18 MICROgram(s) Capsule 1 Capsule(s) Inhalation daily    MEDICATIONS  (PRN):  ketorolac   Injectable 30 milliGRAM(s) IV Push every 8 hours PRN Moderate Pain (4 - 6)  oxycodone    5 mG/acetaminophen 325 mG 2 Tablet(s) Oral every 4 hours PRN Mild Pain (1 - 3)      Vital Signs Last 24 Hrs  T(C): 36.8 (07 Apr 2021 10:11), Max: 37.4 (06 Apr 2021 15:08)  T(F): 98.2 (07 Apr 2021 10:11), Max: 99.3 (06 Apr 2021 15:08)  HR: 103 (07 Apr 2021 10:11) (79 - 103)  BP: 125/73 (07 Apr 2021 10:11) (125/73 - 131/88)  RR: 16 (07 Apr 2021 10:11) (16 - 17)  SpO2: 96% (07 Apr 2021 10:11) (94% - 99%)    GENERAL: Alert, NAD  CHEST/LUNG:  respirations nonlabored, Right pigtail to waterseal, on RA.   HEART: Regular rate and rhythm;   ABDOMEN: + Bowel sounds, soft, Nontender, Nondistended  EXTREMITIES:  no calf tenderness, No edema    I&O's Detail    06 Apr 2021 07:01  -  07 Apr 2021 07:00  --------------------------------------------------------  IN:    Oral Fluid: 360 mL  Total IN: 360 mL    OUT:    Voided (mL): 200 mL  Total OUT: 200 mL    Total NET: 160 mL      07 Apr 2021 07:01  -  07 Apr 2021 14:36  --------------------------------------------------------  IN:    Oral Fluid: 360 mL  Total IN: 360 mL    OUT:    Voided (mL): 350 mL  Total OUT: 350 mL    Total NET: 10 mL          LABS:                        15.0   5.87  )-----------( 213      ( 07 Apr 2021 08:43 )             44.7     04-07    133<L>  |  100  |  14  ----------------------------<  118<H>  4.2   |  26  |  1.07    Ca    9.9      07 Apr 2021 08:43  Phos  2.6     04-07  Mg     1.9     04-07    TPro  7.9  /  Alb  3.1<L>  /  TBili  0.6  /  DBili  x   /  AST  20  /  ALT  45  /  AlkPhos  49  04-07    RADIOLOGY:  < from: CT Chest No Cont (04.06.21 @ 18:05) >    EXAM:  CT CHEST                            PROCEDURE DATE:  04/06/2021          INTERPRETATION:  CLINICAL INFORMATION: Lower lobe effusion with pigtail catheter    COMPARISON: 4/3/2021    CONTRAST/COMPLICATIONS:  IV Contrast: None  Oral Contrast: None  Complications: None    PROCEDURE:  CT of the Chest was performed.  Sagittal and coronal reformats were performed.    FINDINGS:    LUNGS AND AIRWAYS: Patent central airways.  There are extensive blebs/bulla seen bilaterally. There is mild atelectasis at the lung bases.  MEDIASTINUM AND JIMENEZ: No lymphadenopathy.  .  PLEURA: Since the prior study, pigtail thoracotomy tube has been placed with resolution of previously seen right pneumothorax and reinflation of the right lung. VESSELS: Within normal limits.  HEART: Heart size is normal. No pericardial effusion.  CHEST WALL AND LOWER NECK: Within normal limits.  VISUALIZED UPPER ABDOMEN: Within normal limits.  BONES: Degenerative changes    IMPRESSION:  Resolution of large right pneumothorax status post placement of pigtail thoracotomy tube  Underlying multiple blebs and bulla are again appreciated  Bibasilar mild atelectasis

## 2021-04-08 PROCEDURE — 71045 X-RAY EXAM CHEST 1 VIEW: CPT | Mod: 26

## 2021-04-08 PROCEDURE — 99233 SBSQ HOSP IP/OBS HIGH 50: CPT

## 2021-04-08 PROCEDURE — 99232 SBSQ HOSP IP/OBS MODERATE 35: CPT

## 2021-04-08 RX ADMIN — BUDESONIDE AND FORMOTEROL FUMARATE DIHYDRATE 2 PUFF(S): 160; 4.5 AEROSOL RESPIRATORY (INHALATION) at 06:22

## 2021-04-08 RX ADMIN — ALBUTEROL 2 PUFF(S): 90 AEROSOL, METERED ORAL at 06:22

## 2021-04-08 RX ADMIN — BUDESONIDE AND FORMOTEROL FUMARATE DIHYDRATE 2 PUFF(S): 160; 4.5 AEROSOL RESPIRATORY (INHALATION) at 17:09

## 2021-04-08 RX ADMIN — ALBUTEROL 2 PUFF(S): 90 AEROSOL, METERED ORAL at 17:10

## 2021-04-08 RX ADMIN — ALBUTEROL 2 PUFF(S): 90 AEROSOL, METERED ORAL at 23:10

## 2021-04-08 RX ADMIN — TIOTROPIUM BROMIDE 1 CAPSULE(S): 18 CAPSULE ORAL; RESPIRATORY (INHALATION) at 11:34

## 2021-04-08 RX ADMIN — ENOXAPARIN SODIUM 40 MILLIGRAM(S): 100 INJECTION SUBCUTANEOUS at 17:11

## 2021-04-08 RX ADMIN — ALBUTEROL 2 PUFF(S): 90 AEROSOL, METERED ORAL at 11:33

## 2021-04-08 RX ADMIN — ENOXAPARIN SODIUM 40 MILLIGRAM(S): 100 INJECTION SUBCUTANEOUS at 06:22

## 2021-04-08 RX ADMIN — Medication 300 MILLIGRAM(S): at 12:46

## 2021-04-08 NOTE — PROGRESS NOTE ADULT - PROBLEM SELECTOR PLAN 4
s/p recent hospitalization   tolerating room air.
non curently on DVT prophalaxis
s/p recent hospitalization   tolerating room air.
remote history of   ID consult appreciated

## 2021-04-08 NOTE — PROGRESS NOTE ADULT - SUBJECTIVE AND OBJECTIVE BOX
Patient is a 40y old  Male who presents with a chief complaint of SOB from right sided PTX. (08 Apr 2021 09:24)      INTERVAL HPI/ OVERNIGHT EVENTS: Pt was seen and examined at bedside today, No significant overnight events, pt denies any complaints.      MEDICATIONS  (STANDING):  ALBUTerol    90 MICROgram(s) HFA Inhaler 2 Puff(s) Inhalation every 6 hours  allopurinol 300 milliGRAM(s) Oral daily  budesonide 160 MICROgram(s)/formoterol 4.5 MICROgram(s) Inhaler 2 Puff(s) Inhalation two times a day  enoxaparin Injectable 40 milliGRAM(s) SubCutaneous every 12 hours  tiotropium 18 MICROgram(s) Capsule 1 Capsule(s) Inhalation daily    MEDICATIONS  (PRN):  ketorolac   Injectable 30 milliGRAM(s) IV Push every 8 hours PRN Moderate Pain (4 - 6)  oxycodone    5 mG/acetaminophen 325 mG 2 Tablet(s) Oral every 4 hours PRN Mild Pain (1 - 3)      Allergies    No Known Allergies    Intolerances        REVIEW OF SYSTEMS:    Unable to examine due to [ ] Encephalopathy [ ] Advanced Dementia [ ] Expressive Aphasia [ ] Non-verbal patient    CONSTITUTIONAL: No fever, NO generalized weakness/Fatigue, No weight loss  EYES: No eye pain, visual disturbances, or discharge  ENMT:  No difficulty hearing, tinnitus, vertigo; No sinus or throat pain  NECK: No pain or stiffness  RESPIRATORY: No shortness of breath,  cough, wheezing, sputum or hemoptysis   CARDIOVASCULAR: No chest pain, palpitations, or leg swelling  GASTROINTESTINAL: No abdominal pain. No nausea, vomiting, diarrhea or constipation. No melena or hematochezia.  GENITOURINARY: No dysuria, frequency, hematuria, or incontinence  NEUROLOGICAL: No headaches, Dizziness, memory loss, loss of strength, numbness, or tremors  SKIN: No itching, burning, rashes, or lesions   MUSCULOSKELETAL: No joint pain or swelling; No muscle, back, or extremity pain  PSYCHIATRIC: No depression, anxiety, mood swings, or difficulty sleeping  HEME/LYMPH: No easy bruising, or bleeding gums      Vital Signs Last 24 Hrs  T(C): 36.6 (08 Apr 2021 15:55), Max: 36.8 (08 Apr 2021 05:43)  T(F): 97.9 (08 Apr 2021 15:55), Max: 98.2 (08 Apr 2021 05:43)  HR: 87 (08 Apr 2021 15:55) (87 - 94)  BP: 112/82 (08 Apr 2021 15:55) (103/64 - 124/78)  BP(mean): --  RR: 18 (08 Apr 2021 15:55) (16 - 18)  SpO2: 95% (08 Apr 2021 15:55) (95% - 96%)    PHYSICAL EXAM:  GENERAL: NAD, well-developed, well-groomed  HEAD:  Atraumatic, Normocephalic  EYES: conjunctiva and sclera clear  ENMT: Moist mucous membranes  NECK: Supple, No JVD, Normal thyroid  CHEST/LUNG: Clear to Auscultation bilaterally; No rales, rhonchi, wheezing, or rubs  HEART: Regular rate and rhythm; No murmurs, rubs, or gallops  ABDOMEN: Soft, Nontender, Nondistended; Bowel sounds present  EXTREMITIES:  Chest tube, 2+ Peripheral Pulses, No clubbing, cyanosis, or edema  SKIN: No rashes or lesions  NERVOUS SYSTEM:  Alert & Oriented X3, Good concentration; Motor Strength 5/5 B/L upper and lower extremities    LABS:                        15.0   5.87  )-----------( 213      ( 07 Apr 2021 08:43 )             44.7     04-07    133<L>  |  100  |  14  ----------------------------<  118<H>  4.2   |  26  |  1.07    Ca    9.9      07 Apr 2021 08:43  Phos  2.6     04-07  Mg     1.9     04-07    TPro  7.9  /  Alb  3.1<L>  /  TBili  0.6  /  DBili  x   /  AST  20  /  ALT  45  /  AlkPhos  49  04-07        CAPILLARY BLOOD GLUCOSE              RADIOLOGY & ADDITIONAL TESTS:          Imaging Personally Reviewed:  [ ] YES  [ ] NO    Consultant(s) Notes Reviewed:  [ x] YES  [ ] NO    Care Discussed with Consultants/Other Providers [x ] YES  [ ] NO

## 2021-04-08 NOTE — PROGRESS NOTE ADULT - PROBLEM SELECTOR PLAN 6
COPD/ Emphysema/Bullae   cont w/ symbicort, Spiriva and albuterol prn
COPD/ Emphysema/Bullae   cont w/ Symbicort, Spiriva and albuterol prn

## 2021-04-08 NOTE — PROGRESS NOTE ADULT - PROBLEM SELECTOR PLAN 1
supportive care likely residual viral shedding
supportive care likley residuala viral shedding
s/p CP placement   Thoracic Sx on board; repeat CXR show stabilization  CT to be removed today with thoracic
s/p CP placement   Thoracic Sx on board; repeat CXR in am  f/u w/ thoracic for CT removal.

## 2021-04-08 NOTE — PROGRESS NOTE ADULT - SUBJECTIVE AND OBJECTIVE BOX
RANDELL HARVEY    LVS 2C 251 D    Patient is a 40y old  Male who presents with a chief complaint of SOB from right sided PTX. (07 Apr 2021 17:30)       Allergies    No Known Allergies    Intolerances        HPI:  41 y/o Male PMH prior PE, prior TB presents with c/o trouble breathing.  Patient states he was previously hospitalized x 3 weeks ago for COVID+ diagnosis. Patient  states he'd had trouble breathing during hospitalization, was discharged home about 10 days ago.     Pt came in today after his primary physician demanding he come in for evaluation. CTA confirmed large right PTX, no PE.  (03 Apr 2021 19:22)      PAST MEDICAL & SURGICAL HISTORY:  PE (pulmonary thromboembolism)    Tuberculosis    No significant past surgical history        FAMILY HISTORY:  No pertinent family history in first degree relatives          MEDICATIONS   ALBUTerol    90 MICROgram(s) HFA Inhaler 2 Puff(s) Inhalation every 6 hours  allopurinol 300 milliGRAM(s) Oral daily  budesonide 160 MICROgram(s)/formoterol 4.5 MICROgram(s) Inhaler 2 Puff(s) Inhalation two times a day  enoxaparin Injectable 40 milliGRAM(s) SubCutaneous every 12 hours  ketorolac   Injectable 30 milliGRAM(s) IV Push every 8 hours PRN  oxycodone    5 mG/acetaminophen 325 mG 2 Tablet(s) Oral every 4 hours PRN  tiotropium 18 MICROgram(s) Capsule 1 Capsule(s) Inhalation daily      Vital Signs Last 24 Hrs  T(C): 36.8 (08 Apr 2021 05:43), Max: 37 (07 Apr 2021 15:19)  T(F): 98.2 (08 Apr 2021 05:43), Max: 98.6 (07 Apr 2021 15:19)  HR: 87 (08 Apr 2021 05:43) (87 - 103)  BP: 103/64 (08 Apr 2021 05:43) (103/64 - 133/83)  BP(mean): --  RR: 17 (08 Apr 2021 05:43) (16 - 17)  SpO2: 95% (08 Apr 2021 05:43) (95% - 97%)      04-07-21 @ 07:01  -  04-08-21 @ 07:00  --------------------------------------------------------  IN: 610 mL / OUT: 750 mL / NET: -140 mL            LABS:                        15.0   5.87  )-----------( 213      ( 07 Apr 2021 08:43 )             44.7     04-07    133<L>  |  100  |  14  ----------------------------<  118<H>  4.2   |  26  |  1.07    Ca    9.9      07 Apr 2021 08:43  Phos  2.6     04-07  Mg     1.9     04-07    TPro  7.9  /  Alb  3.1<L>  /  TBili  0.6  /  DBili  x   /  AST  20  /  ALT  45  /  AlkPhos  49  04-07              WBC:  WBC Count: 5.87 K/uL (04-07 @ 08:43)  WBC Count: 4.41 K/uL (04-05 @ 09:10)      MICROBIOLOGY:  RECENT CULTURES:                  Sodium:  Sodium, Serum: 133 mmol/L (04-07 @ 08:43)  Sodium, Serum: 138 mmol/L (04-05 @ 09:10)  Sodium, Serum: 140 mmol/L (04-04 @ 10:22)      1.07 mg/dL 04-07 @ 08:43  1.05 mg/dL 04-05 @ 09:10  1.12 mg/dL 04-04 @ 10:22      Hemoglobin:  Hemoglobin: 15.0 g/dL (04-07 @ 08:43)  Hemoglobin: 14.0 g/dL (04-05 @ 09:10)      Platelets: Platelet Count - Automated: 213 K/uL (04-07 @ 08:43)  Platelet Count - Automated: 209 K/uL (04-05 @ 09:10)      LIVER FUNCTIONS - ( 07 Apr 2021 08:43 )  Alb: 3.1 g/dL / Pro: 7.9 gm/dL / ALK PHOS: 49 U/L / ALT: 45 U/L / AST: 20 U/L / GGT: x                 RADIOLOGY & ADDITIONAL STUDIES:

## 2021-04-08 NOTE — PROGRESS NOTE ADULT - PROBLEM SELECTOR PROBLEM 4
PE (pulmonary thromboembolism)
Tuberculosis
2019 novel coronavirus disease (COVID-19)
2019 novel coronavirus disease (COVID-19)

## 2021-04-08 NOTE — PROGRESS NOTE ADULT - PROBLEM SELECTOR PLAN 3
s/p chest tube will follow  on water seal   will follow up with ct surgery will likely be dc tomorrow
remote history of   ID consult appreciated
cont w/ allopurinol
cont w/ allopurinol

## 2021-04-08 NOTE — PROGRESS NOTE ADULT - PROBLEM SELECTOR PLAN 2
s/p chest tube will follow
cont w/ Lovenox
cont w/ Lovenox
acute    uric acid level in am   Toradol for pain   prednisone   allopurinol  Colchicine

## 2021-04-08 NOTE — PROGRESS NOTE ADULT - ASSESSMENT
HARVEY RANDELL 40 1980 4/3/2021 DR DIANE TREJO           REVIEW OF SYMPTOMS      Able to give ROS  Yes     RELIABLE +/-   CONSTITUTIONAL Weakness Yes  Chills No   ENDOCRINE  No heat or cold intolerance    ALLERGY No hives  Sore throat No stridor  RESP Coughing blood no  Shortness of breath YES   NEURO No Headache  Confusion Pain neck No   CARDIAC No Chest pain No Palpitations   GI  Pain abdomen NO   Vomiting NO     PHYSICAL EXAM    HEENT Unremarkable  atraumatic   RESP Fair air entry EXP prolonged    Harsh breath sound Resp distres mild   CARDIAC S1 S2 No S3     NO JVD    ABDOMEN SOFT BS PRESENT NOT DISTENDED No hepatosplenomegaly PEDAL EDEMA present No calf tenderness  NO rash       PATIENT SUMMARY  40 m former smoker quit 1 p 1 y ago past ho tb ho pulmonary embolism admitted 4/3 with large r pneumothorax He hd been rxed for covid 3 w ago admitted 3/21-3/24 dced home after observtion No rdv or dex given   Pt readmitted 4/3 with dysonea and was found to have R pntx   R pigtail was placed 4/3 with reexpansion of lung  cts on case   His scv2 was negv 4/3 but posv 4/4   Pulm consulted 4/4/2021       PROBLEMS.  SCV2 POSV 4/4/2021   SCV2 ADMISSION 3/21-3/24  LARGE R PNEUMOTHORAX 4/3 CT [facility]   PIGTAIL 4/3   ELEVATED D-DIMR 4/3 d-d 464   NSVT EPISODE 4/4  REMOTE HO ACTIVE TB  PAST HO PE       GLOBAL AND BEST PRACTICE ISSUES                     ALLERGY.  nka    WEIGHT.            4/4/2021 84k                    BMI.                  4/4/2021 25      ADVANCED DIRECTIVE.                               HEAD OF BED ELEVATION. Yes  DVT PROPHYLAXIS.   lvnx 40 (4/3)                    MEAD PROPHYLAXIS.   APA.                                                                    DYSPHAGIA RECOMM.   DIET.    REG (4/3)   INFECTION PROPHYLAXIS.   FREE WATER.      ASSESSMENT/RECOMMENDATIONS.    COVID   Monitor markers  Monitor oxygenation  Oxygen supplementation as needed to keep pulse ox 90-95%  ID consult 4/5/2021 appreciated No RDV or DEX planned as hypoxia likely sec to pntx and scv2 pcr  detected is likely inactive virus  He had scv2 admission 3/21   PNEUMOTHORAX SPONTANEOUS IN SETTING OF COVID   Has chest tube   Is being followed by cts   Monitor closely DC chest tube once airleak stops and pt tolerates clamping chets tube   ELEVATED D-DIMER   4/5/2021 v duplex negv   4/3 cta ch no pe   Elevated d-dimr in setting of covid and past ho pe   Changed  lovenox to 40 q 12 h (4/4)   COPD  On duoneb symbicort   4/4/2021 Added spiriva   HO TB   Treated several years ago       TIME SPENT   Over 25 minutes aggregate care time spent on encounter; activities included   direct patient care, counseling and/or coordinating care reviewing notes, lab data/ imaging , discussion with multidisciplinary team/ patient  /family and explaining in detail risks, benefits, alternatives  of the recommendations     CANDICE MEJIAS 40 1980 4/3/2021 DR DIANE TREJO

## 2021-04-09 VITALS
SYSTOLIC BLOOD PRESSURE: 144 MMHG | DIASTOLIC BLOOD PRESSURE: 82 MMHG | TEMPERATURE: 98 F | RESPIRATION RATE: 18 BRPM | OXYGEN SATURATION: 96 % | HEART RATE: 86 BPM

## 2021-04-09 PROCEDURE — 99239 HOSP IP/OBS DSCHRG MGMT >30: CPT

## 2021-04-09 PROCEDURE — 71045 X-RAY EXAM CHEST 1 VIEW: CPT | Mod: 26

## 2021-04-09 PROCEDURE — 99232 SBSQ HOSP IP/OBS MODERATE 35: CPT

## 2021-04-09 RX ORDER — TIOTROPIUM BROMIDE 18 UG/1
1 CAPSULE ORAL; RESPIRATORY (INHALATION)
Qty: 30 | Refills: 0
Start: 2021-04-09 | End: 2021-05-08

## 2021-04-09 RX ORDER — RIVAROXABAN 15 MG-20MG
1 KIT ORAL
Qty: 30 | Refills: 0
Start: 2021-04-09 | End: 2021-05-08

## 2021-04-09 RX ORDER — BUDESONIDE AND FORMOTEROL FUMARATE DIHYDRATE 160; 4.5 UG/1; UG/1
2 AEROSOL RESPIRATORY (INHALATION)
Qty: 1 | Refills: 0
Start: 2021-04-09 | End: 2021-05-08

## 2021-04-09 RX ORDER — ALLOPURINOL 300 MG
1 TABLET ORAL
Qty: 30 | Refills: 0
Start: 2021-04-09 | End: 2021-05-08

## 2021-04-09 RX ORDER — ALBUTEROL 90 UG/1
2 AEROSOL, METERED ORAL
Qty: 1 | Refills: 0
Start: 2021-04-09 | End: 2021-05-08

## 2021-04-09 RX ADMIN — ALBUTEROL 2 PUFF(S): 90 AEROSOL, METERED ORAL at 11:36

## 2021-04-09 RX ADMIN — BUDESONIDE AND FORMOTEROL FUMARATE DIHYDRATE 2 PUFF(S): 160; 4.5 AEROSOL RESPIRATORY (INHALATION) at 05:16

## 2021-04-09 RX ADMIN — Medication 300 MILLIGRAM(S): at 11:36

## 2021-04-09 RX ADMIN — ENOXAPARIN SODIUM 40 MILLIGRAM(S): 100 INJECTION SUBCUTANEOUS at 05:15

## 2021-04-09 RX ADMIN — ALBUTEROL 2 PUFF(S): 90 AEROSOL, METERED ORAL at 05:16

## 2021-04-09 NOTE — PROGRESS NOTE ADULT - PROBLEM SELECTOR PROBLEM 2
2019 novel coronavirus disease (COVID-19)
2019 novel coronavirus disease (COVID-19)
Gout of big toe
2019 novel coronavirus disease (COVID-19)
PE (pulmonary thromboembolism)
Tension pneumothorax
PE (pulmonary thromboembolism)

## 2021-04-09 NOTE — DISCHARGE NOTE PROVIDER - PROVIDER TOKENS
PROVIDER:[TOKEN:[[id number]],FOLLOWUP:[1 week]] PROVIDER:[TOKEN:[[id number]],FOLLOWUP:[1 week]],PROVIDER:[TOKEN:[[id number]]]

## 2021-04-09 NOTE — DISCHARGE NOTE NURSING/CASE MANAGEMENT/SOCIAL WORK - PATIENT PORTAL LINK FT
You can access the FollowMyHealth Patient Portal offered by [facility] by registering at the following website: [email or web address]. By joining [facility]’s FollowMyHealth portal, you will also be able to view your health information using other applications (apps) compatible with our system.

## 2021-04-09 NOTE — PROGRESS NOTE ADULT - ASSESSMENT
PATIENT SUMMARY  40 m former smoker quit 1 p 1 y ago past ho tb ho pulmonary embolism admitted 4/3 with large r pneumothorax He hd been rxed for covid 3 w ago admitted 3/21-3/24 dced home after observtion No rdv or dex given   Pt readmitted 4/3 with dysonea and was found to have R pntx   R pigtail was placed 4/3 with reexpansion of lung  cts on case   His scv2 was negv 4/3 but posv 4/4   Pulm consulted 4/4/2021     Home meds.       PROBLEMS.  SCV2 POSV 4/4/2021   SCV2 ADMISSION 3/21-3/24  LARGE R PNEUMOTHORAX 4/3 CT CH   PIGTAIL 4/3   ELEVATED D-DIMR 4/3 d-d 464   NSVT EPISODE 4/4  REMOTE HO ACTIVE TB  PAST HO PE     ASSESSMENT/RECOMMENDATIONS.    COVID   Monitor markers  Monitor oxygenation  Oxygen supplementation as needed to keep pulse ox 90-95%  ID consult 4/5/2021 appreciated No RDV or DEX planned as hypoxia likely sec to pntx and scv2 pcr  detected is likely inactive virus  He had scv2 admission 3/21   PNEUMOTHORAX SPONTANEOUS IN SETTING OF COVID   Has chest tube   Is being followed by cts   Monitor closely DC chest tube once airleak stops and pt tolerates clamping chets tube  dc plneed on pnumostat   ELEVATED D-DIMER   4/5/2021 v duplex negv   4/3 cta ch no pe   Elevated d-dimr in setting of covid and past ho pe   Changed  lovenox to 40 q 12 h (4/4)   COPD  On duoneb symbicort   4/4/2021 Added spiriva   HO TB   Treated several years ago     TIME SPENT   Over 25 minutes aggregate care time spent on encounter; activities included   direct patient care, counseling and/or coordinating care reviewing notes, lab data/ imaging , discussion with multidisciplinary team/ patient  /family and explaining in detail risks, benefits, alternatives  of the recommendations     CANDICE MEJIAS 40 [id number] 4/3/2021 DR DIANE TREJO

## 2021-04-09 NOTE — DISCHARGE NOTE PROVIDER - CARE PROVIDER_API CALL
Staci Reyes  SURGERY  [address], [address]  [place] [address]  Phone: [phone]  Fax: [phone]  Follow Up Time: 1 week   Staci Reyes  SURGERY  [address], [address]  [place] [address]  Phone: [phone]  Fax: [phone]  Follow Up Time: 1 week    Elia Ramirez)  Critical Care Medicine; Internal Medicine; Pulmonary Disease  [facility] [place] [address]  Phone: [phone]  Fax: [phone]  Follow Up Time:

## 2021-04-09 NOTE — PROGRESS NOTE ADULT - ASSESSMENT
41yo male s/p right pigtail chest tube for tension pneumothorax.  Clinically doing well.     CXR from this am pending.     If CXR stable, plan connect chest tube to Pneumostat and will be stable for DC home for follow up with Dr. Reyes as outpatient  39yo male s/p right pigtail chest tube for tension pneumothorax.  Clinically doing well.     CXR from this am appears stable with no interval change from 4/8     Pending official read of CXR, plan connect chest tube to Pneumostat and will be stable for DC home for follow up with Dr. Reyes as outpatient

## 2021-04-09 NOTE — DISCHARGE NOTE PROVIDER - HOSPITAL COURSE
41 y/o Male PMH prior PE, prior TB presents with c/o trouble breathing.  Patient states he was previously hospitalized x 3 weeks ago for recent COVID PNA. Patient  states he'd had trouble breathing during hospitalization, was discharged home about 10 days ago.     CTA confirmed large right PTX, no PE.     The patient was admitted to Medical isolation bed. Pulmonary, Thoracic surgery, and ID were consulted. The patient had a Chest tube was placed. Serial Chest xray showed resolution of pnemothorax. Right pigtail chest tube was clamped with Pneumovax connection disconnected. A Pneumostat was connected to pigtail chest tube and he tolerated it well. The patient will be discharged home with Pneumostat. The patient will follow up with thoracic surgery as outpatient.      41 y/o Male PMH hx of PE (5 y/o), prior TB presents with c/o trouble breathing.  Patient states he was previously hospitalized x 3 weeks ago for recent COVID PNA. Patient  states he'd had trouble breathing during hospitalization, was discharged home about 10 days ago.     CTA confirmed large right PTX, no PE.     The patient was admitted to Medical isolation bed. Pulmonary, Thoracic surgery, and ID were consulted. The patient had a Chest tube was placed. Serial Chest xray showed resolution of pnemothorax. Right pigtail chest tube was clamped with Pneumovax connection disconnected. A Pneumostat was connected to pigtail chest tube and he tolerated it well. The patient will be discharged home with Pneumostat. The patient will follow up with thoracic surgery as outpatient.

## 2021-04-09 NOTE — PROGRESS NOTE ADULT - SUBJECTIVE AND OBJECTIVE BOX
Progress Note- Thoracic Surgery      Status Post:  RT pigtail CT for tension pneumothorax     SUBJECTIVE: pt feel well this morning, no cough no SOB    MEDICATIONS  (STANDING):  ALBUTerol    90 MICROgram(s) HFA Inhaler 2 Puff(s) Inhalation every 6 hours  allopurinol 300 milliGRAM(s) Oral daily  budesonide 160 MICROgram(s)/formoterol 4.5 MICROgram(s) Inhaler 2 Puff(s) Inhalation two times a day  enoxaparin Injectable 40 milliGRAM(s) SubCutaneous every 12 hours  tiotropium 18 MICROgram(s) Capsule 1 Capsule(s) Inhalation daily    MEDICATIONS  (PRN):  ketorolac   Injectable 30 milliGRAM(s) IV Push every 8 hours PRN Moderate Pain (4 - 6)  oxycodone    5 mG/acetaminophen 325 mG 2 Tablet(s) Oral every 4 hours PRN Mild Pain (1 - 3)      Vital Signs Last 24 Hrs  T(C): 36.6 (09 Apr 2021 05:26), Max: 36.8 (08 Apr 2021 10:25)  T(F): 97.9 (09 Apr 2021 05:26), Max: 98.2 (08 Apr 2021 10:25)  HR: 68 (09 Apr 2021 05:26) (68 - 94)  BP: 127/80 (09 Apr 2021 05:26) (112/82 - 127/80)  BP(mean): --  RR: 18 (09 Apr 2021 05:26) (16 - 18)  SpO2: 96% (09 Apr 2021 05:26) (95% - 96%)    PHYSICAL EXAM:    Constitutional: WDWN male in NAD    Respiratory: Pigtail CT RT connected to water seal- no leaks noted.       I&O's Detail    08 Apr 2021 07:01  -  09 Apr 2021 07:00  --------------------------------------------------------  IN:    Oral Fluid: 620 mL  Total IN: 620 mL    OUT:    Chest Tube (mL): 0 mL  Total OUT: 0 mL    Total NET: 620 mL                   Progress Note- Thoracic Surgery      Status Post:  RT pigtail CT for tension pneumothorax     SUBJECTIVE: pt feel well this morning, no cough no SOB    MEDICATIONS  (STANDING):  ALBUTerol    90 MICROgram(s) HFA Inhaler 2 Puff(s) Inhalation every 6 hours  allopurinol 300 milliGRAM(s) Oral daily  budesonide 160 MICROgram(s)/formoterol 4.5 MICROgram(s) Inhaler 2 Puff(s) Inhalation two times a day  enoxaparin Injectable 40 milliGRAM(s) SubCutaneous every 12 hours  tiotropium 18 MICROgram(s) Capsule 1 Capsule(s) Inhalation daily    MEDICATIONS  (PRN):  ketorolac   Injectable 30 milliGRAM(s) IV Push every 8 hours PRN Moderate Pain (4 - 6)  oxycodone    5 mG/acetaminophen 325 mG 2 Tablet(s) Oral every 4 hours PRN Mild Pain (1 - 3)      Vital Signs Last 24 Hrs  T(C): 36.6 (09 Apr 2021 05:26), Max: 36.8 (08 Apr 2021 10:25)  T(F): 97.9 (09 Apr 2021 05:26), Max: 98.2 (08 Apr 2021 10:25)  HR: 68 (09 Apr 2021 05:26) (68 - 94)  BP: 127/80 (09 Apr 2021 05:26) (112/82 - 127/80)  BP(mean): --  RR: 18 (09 Apr 2021 05:26) (16 - 18)  SpO2: 96% (09 Apr 2021 05:26) (95% - 96%)    PHYSICAL EXAM:    Constitutional: WDWN male in NAD    Respiratory: Pigtail CT RT connected to water seal- no leaks noted.       I&O's Detail    08 Apr 2021 07:01  -  09 Apr 2021 07:00  --------------------------------------------------------  IN:    Oral Fluid: 620 mL  Total IN: 620 mL    OUT:    Chest Tube (mL): 0 mL  Total OUT: 0 mL    Total NET: 620 mL      PROCEDURE:  Right pigtail chest tube was clamped. Pneumovac connection disconnected. Pneumostat connected to pigtail chest tube. Clamp removed.

## 2021-04-09 NOTE — DISCHARGE NOTE PROVIDER - NSDCMRMEDTOKEN_GEN_ALL_CORE_FT
albuterol 90 mcg/inh inhalation aerosol: 2 puff(s) inhaled every 6 hours, As Needed -for shortness of breath and/or wheezing   allopurinol 100 mg oral tablet: 1 tab(s) orally once a day   budesonide-formoterol 160 mcg-4.5 mcg/inh inhalation aerosol: 2 puff(s) inhaled 2 times a day   oxycodone-acetaminophen 5 mg-325 mg oral tablet: 1 tab(s) orally every 6 hours, As Needed -Mild Pain (1 - 3)  - for severe pain MDD:6 tabs  tiotropium 18 mcg inhalation capsule: 1 cap(s) inhaled once a day  Xarelto 10 mg oral tablet: 1 tab(s) orally once a day

## 2021-04-09 NOTE — PROGRESS NOTE ADULT - PROBLEM SELECTOR PROBLEM 1
Tension pneumothorax
2019 novel coronavirus disease (COVID-19)
Tension pneumothorax
2019 novel coronavirus disease (COVID-19)
Tension pneumothorax

## 2021-04-09 NOTE — CHART NOTE - NSCHARTNOTEFT_GEN_A_CORE
To whom it may concern:     Mr. Stefan Gonzalez 1980 has been under inpatient care at [facility] since 03-April-2021. He was discharged from the hospital 09-April-2021. Mr. Aviles will be completing his treatment at home and will not be able to work with this treatment. The patient was instructed to return to work only after his treatment is completed and he no longer has any symptoms. Please make any necessary accommodations. Thank you.         If there are any questions please do not hesitate to call        Alphonse Mallory M.D   [phone]

## 2021-04-09 NOTE — PROGRESS NOTE ADULT - REASON FOR ADMISSION
SOB from right sided PTX.

## 2021-04-09 NOTE — PROGRESS NOTE ADULT - NSICDXPILOT_GEN_ALL_CORE
[place]

## 2021-04-09 NOTE — DISCHARGE NOTE PROVIDER - NSDCCPCAREPLAN_GEN_ALL_CORE_FT
PRINCIPAL DISCHARGE DIAGNOSIS  Diagnosis: Tension pneumothorax  Assessment and Plan of Treatment:       SECONDARY DISCHARGE DIAGNOSES  Diagnosis: Chronic obstructive pulmonary disease, unspecified COPD type  Assessment and Plan of Treatment: Chronic obstructive pulmonary disease, unspecified COPD type    Diagnosis: PE (pulmonary thromboembolism)  Assessment and Plan of Treatment: PE (pulmonary thromboembolism)    Diagnosis: 2019 novel coronavirus disease (COVID-19)  Assessment and Plan of Treatment: 2019 novel coronavirus disease (COVID-19)     PRINCIPAL DISCHARGE DIAGNOSIS  Diagnosis: Tension pneumothorax  Assessment and Plan of Treatment: s/p CP placement 4/3   repeat CXR show stabilization of Pneumothorax   Pneumostat was connected to pigtail   Discharge home with Pneumostat with Thoracic surgery outpatient follow up with Dr. Reyes      SECONDARY DISCHARGE DIAGNOSES  Diagnosis: 2019 novel coronavirus disease (COVID-19)  Assessment and Plan of Treatment: s/p Recent hospitalization   Elevated D-Dimer: continue with Xarelto    Diagnosis: PE (pulmonary thromboembolism)  Assessment and Plan of Treatment: PE (pulmonary thromboembolism)    Diagnosis: Chronic obstructive pulmonary disease, unspecified COPD type  Assessment and Plan of Treatment: COPD/ Emphysema/Bullae   cont w/ Symbicort, Spiriva and albuterol (as needed)   follow up with Pulmonary Dr. Ramirez    Diagnosis: Gout of big toe  Assessment and Plan of Treatment: Acute Gout flare during hospitalization; resolved   continue with Allopurinol     PRINCIPAL DISCHARGE DIAGNOSIS  Diagnosis: Tension pneumothorax  Assessment and Plan of Treatment: s/p CP placement 4/3   repeat CXR show stabilization of Pneumothorax   Pneumostat was connected to pigtail   Discharge home with Pneumostat with Thoracic surgery outpatient follow up with Dr. Reyes      SECONDARY DISCHARGE DIAGNOSES  Diagnosis: 2019 novel coronavirus disease (COVID-19)  Assessment and Plan of Treatment: s/p Recent hospitalization, +covid PCR POA    Elevated D-Dimer: continue with Xarelto for 30 days.    Diagnosis: Chronic obstructive pulmonary disease, unspecified COPD type  Assessment and Plan of Treatment: COPD/ Emphysema/Bullae   cont w/ Symbicort, Spiriva and albuterol (as needed)   follow up with Pulmonary Dr. Ramirez    Diagnosis: Gout of big toe  Assessment and Plan of Treatment: Acute Gout flare during hospitalization; resolved   continue with Allopurinol

## 2021-04-09 NOTE — PROGRESS NOTE ADULT - PROVIDER SPECIALTY LIST ADULT
Hospitalist
Hospitalist
Pulmonology
Pulmonology
Thoracic Surgery
Pulmonology
Thoracic Surgery
Pulmonology
Pulmonology
Thoracic Surgery
Infectious Disease
Hospitalist

## 2021-04-09 NOTE — PROGRESS NOTE ADULT - SUBJECTIVE AND OBJECTIVE BOX
RANDELL HARVEY    LVS 2C 251 D    Patient is a 40y old  Male who presents with a chief complaint of SOB from right sided PTX. (08 Apr 2021 17:00)       Allergies    No Known Allergies    Intolerances        HPI:  41 y/o Male PMH prior PE, prior TB presents with c/o trouble breathing.  Patient states he was previously hospitalized x 3 weeks ago for COVID+ diagnosis. Patient  states he'd had trouble breathing during hospitalization, was discharged home about 10 days ago.     Pt came in today after his primary physician demanding he come in for evaluation. CTA confirmed large right PTX, no PE.  (03 Apr 2021 19:22)      PAST MEDICAL & SURGICAL HISTORY:  PE (pulmonary thromboembolism)    Tuberculosis    No significant past surgical history        FAMILY HISTORY:  No pertinent family history in first degree relatives          MEDICATIONS   ALBUTerol    90 MICROgram(s) HFA Inhaler 2 Puff(s) Inhalation every 6 hours  allopurinol 300 milliGRAM(s) Oral daily  budesonide 160 MICROgram(s)/formoterol 4.5 MICROgram(s) Inhaler 2 Puff(s) Inhalation two times a day  enoxaparin Injectable 40 milliGRAM(s) SubCutaneous every 12 hours  ketorolac   Injectable 30 milliGRAM(s) IV Push every 8 hours PRN  oxycodone    5 mG/acetaminophen 325 mG 2 Tablet(s) Oral every 4 hours PRN  tiotropium 18 MICROgram(s) Capsule 1 Capsule(s) Inhalation daily      Vital Signs Last 24 Hrs  T(C): 36.6 (09 Apr 2021 05:26), Max: 36.8 (08 Apr 2021 10:25)  T(F): 97.9 (09 Apr 2021 05:26), Max: 98.2 (08 Apr 2021 10:25)  HR: 68 (09 Apr 2021 05:26) (68 - 94)  BP: 127/80 (09 Apr 2021 05:26) (112/82 - 127/80)  BP(mean): --  RR: 18 (09 Apr 2021 05:26) (16 - 18)  SpO2: 96% (09 Apr 2021 05:26) (95% - 96%)      04-07-21 @ 07:01  -  04-08-21 @ 07:00  --------------------------------------------------------  IN: 610 mL / OUT: 750 mL / NET: -140 mL    04-08-21 @ 07:01  -  04-09-21 @ 06:53  --------------------------------------------------------  IN: 620 mL / OUT: 0 mL / NET: 620 mL            LABS:                        15.0   5.87  )-----------( 213      ( 07 Apr 2021 08:43 )             44.7     04-07    133<L>  |  100  |  14  ----------------------------<  118<H>  4.2   |  26  |  1.07    Ca    9.9      07 Apr 2021 08:43  Phos  2.6     04-07  Mg     1.9     04-07    TPro  7.9  /  Alb  3.1<L>  /  TBili  0.6  /  DBili  x   /  AST  20  /  ALT  45  /  AlkPhos  49  04-07              WBC:  WBC Count: 5.87 K/uL (04-07 @ 08:43)  WBC Count: 4.41 K/uL (04-05 @ 09:10)      MICROBIOLOGY:  RECENT CULTURES:                  Sodium:  Sodium, Serum: 133 mmol/L (04-07 @ 08:43)  Sodium, Serum: 138 mmol/L (04-05 @ 09:10)      1.07 mg/dL 04-07 @ 08:43  1.05 mg/dL 04-05 @ 09:10      Hemoglobin:  Hemoglobin: 15.0 g/dL (04-07 @ 08:43)  Hemoglobin: 14.0 g/dL (04-05 @ 09:10)      Platelets: Platelet Count - Automated: 213 K/uL (04-07 @ 08:43)  Platelet Count - Automated: 209 K/uL (04-05 @ 09:10)      LIVER FUNCTIONS - ( 07 Apr 2021 08:43 )  Alb: 3.1 g/dL / Pro: 7.9 gm/dL / ALK PHOS: 49 U/L / ALT: 45 U/L / AST: 20 U/L / GGT: x                 RADIOLOGY & ADDITIONAL STUDIES:

## 2021-04-12 RX ORDER — ENOXAPARIN SODIUM 100 MG/ML
40 INJECTION SUBCUTANEOUS
Qty: 60 | Refills: 0
Start: 2021-04-12 | End: 2021-05-11

## 2021-04-16 DIAGNOSIS — I47.2 VENTRICULAR TACHYCARDIA: ICD-10-CM

## 2021-04-16 DIAGNOSIS — J93.0 SPONTANEOUS TENSION PNEUMOTHORAX: ICD-10-CM

## 2021-04-16 DIAGNOSIS — J98.4 OTHER DISORDERS OF LUNG: ICD-10-CM

## 2021-04-16 DIAGNOSIS — Z86.711 PERSONAL HISTORY OF PULMONARY EMBOLISM: ICD-10-CM

## 2021-04-16 DIAGNOSIS — Z86.11 PERSONAL HISTORY OF TUBERCULOSIS: ICD-10-CM

## 2021-04-16 DIAGNOSIS — J98.11 ATELECTASIS: ICD-10-CM

## 2021-04-16 DIAGNOSIS — B94.8 SEQUELAE OF OTHER SPECIFIED INFECTIOUS AND PARASITIC DISEASES: ICD-10-CM

## 2021-04-16 DIAGNOSIS — Z87.891 PERSONAL HISTORY OF NICOTINE DEPENDENCE: ICD-10-CM

## 2021-04-16 DIAGNOSIS — J43.9 EMPHYSEMA, UNSPECIFIED: ICD-10-CM

## 2021-04-16 DIAGNOSIS — M10.079 IDIOPATHIC GOUT, UNSPECIFIED ANKLE AND FOOT: ICD-10-CM

## 2021-04-16 DIAGNOSIS — R09.02 HYPOXEMIA: ICD-10-CM

## 2021-04-16 DIAGNOSIS — R79.89 OTHER SPECIFIED ABNORMAL FINDINGS OF BLOOD CHEMISTRY: ICD-10-CM

## 2021-04-21 ENCOUNTER — EMERGENCY (EMERGENCY)
Facility: HOSPITAL | Age: 41
LOS: 0 days | Discharge: ANOTHER HOSPITAL | End: 2021-04-22
Attending: STUDENT IN AN ORGANIZED HEALTH CARE EDUCATION/TRAINING PROGRAM
Payer: COMMERCIAL

## 2021-04-21 VITALS
OXYGEN SATURATION: 94 % | HEART RATE: 110 BPM | DIASTOLIC BLOOD PRESSURE: 91 MMHG | TEMPERATURE: 100 F | WEIGHT: 184.97 LBS | RESPIRATION RATE: 18 BRPM | SYSTOLIC BLOOD PRESSURE: 127 MMHG | HEIGHT: 71 IN

## 2021-04-21 DIAGNOSIS — Z20.822 CONTACT WITH AND (SUSPECTED) EXPOSURE TO COVID-19: ICD-10-CM

## 2021-04-21 DIAGNOSIS — I26.99 OTHER PULMONARY EMBOLISM WITHOUT ACUTE COR PULMONALE: ICD-10-CM

## 2021-04-21 DIAGNOSIS — J44.9 CHRONIC OBSTRUCTIVE PULMONARY DISEASE, UNSPECIFIED: ICD-10-CM

## 2021-04-21 DIAGNOSIS — R06.02 SHORTNESS OF BREATH: ICD-10-CM

## 2021-04-21 DIAGNOSIS — A15.9 RESPIRATORY TUBERCULOSIS UNSPECIFIED: ICD-10-CM

## 2021-04-21 DIAGNOSIS — J93.9 PNEUMOTHORAX, UNSPECIFIED: ICD-10-CM

## 2021-04-21 PROCEDURE — 99291 CRITICAL CARE FIRST HOUR: CPT

## 2021-04-21 NOTE — ED ADULT TRIAGE NOTE - CHIEF COMPLAINT QUOTE
pt c/o difficulty breathing since 2pm.  pt has a stent in R-lung x 2-3 weeks, s/p collapse lung.  denies chest pain.

## 2021-04-22 VITALS
DIASTOLIC BLOOD PRESSURE: 90 MMHG | TEMPERATURE: 99 F | RESPIRATION RATE: 22 BRPM | OXYGEN SATURATION: 95 % | SYSTOLIC BLOOD PRESSURE: 122 MMHG | HEART RATE: 97 BPM

## 2021-04-22 LAB
ALBUMIN SERPL ELPH-MCNC: 3.3 G/DL — SIGNIFICANT CHANGE UP (ref 3.3–5)
ALP SERPL-CCNC: 62 U/L — SIGNIFICANT CHANGE UP (ref 40–120)
ALT FLD-CCNC: 46 U/L — SIGNIFICANT CHANGE UP (ref 12–78)
ANION GAP SERPL CALC-SCNC: 8 MMOL/L — SIGNIFICANT CHANGE UP (ref 5–17)
APTT BLD: 38.8 SEC — HIGH (ref 27.5–35.5)
AST SERPL-CCNC: 23 U/L — SIGNIFICANT CHANGE UP (ref 15–37)
BASOPHILS # BLD AUTO: 0.02 K/UL — SIGNIFICANT CHANGE UP (ref 0–0.2)
BASOPHILS NFR BLD AUTO: 0.3 % — SIGNIFICANT CHANGE UP (ref 0–2)
BILIRUB SERPL-MCNC: 0.3 MG/DL — SIGNIFICANT CHANGE UP (ref 0.2–1.2)
BUN SERPL-MCNC: 18 MG/DL — SIGNIFICANT CHANGE UP (ref 7–23)
CALCIUM SERPL-MCNC: 9.8 MG/DL — SIGNIFICANT CHANGE UP (ref 8.5–10.1)
CHLORIDE SERPL-SCNC: 103 MMOL/L — SIGNIFICANT CHANGE UP (ref 96–108)
CO2 SERPL-SCNC: 27 MMOL/L — SIGNIFICANT CHANGE UP (ref 22–31)
CREAT SERPL-MCNC: 1.02 MG/DL — SIGNIFICANT CHANGE UP (ref 0.5–1.3)
EOSINOPHIL # BLD AUTO: 0.07 K/UL — SIGNIFICANT CHANGE UP (ref 0–0.5)
EOSINOPHIL NFR BLD AUTO: 0.9 % — SIGNIFICANT CHANGE UP (ref 0–6)
FLUAV AG NPH QL: SIGNIFICANT CHANGE UP
FLUBV AG NPH QL: SIGNIFICANT CHANGE UP
GLUCOSE SERPL-MCNC: 108 MG/DL — HIGH (ref 70–99)
HCT VFR BLD CALC: 45.6 % — SIGNIFICANT CHANGE UP (ref 39–50)
HGB BLD-MCNC: 14.8 G/DL — SIGNIFICANT CHANGE UP (ref 13–17)
IMM GRANULOCYTES NFR BLD AUTO: 0.4 % — SIGNIFICANT CHANGE UP (ref 0–1.5)
INR BLD: 1.3 RATIO — HIGH (ref 0.88–1.16)
LYMPHOCYTES # BLD AUTO: 2.51 K/UL — SIGNIFICANT CHANGE UP (ref 1–3.3)
LYMPHOCYTES # BLD AUTO: 33.2 % — SIGNIFICANT CHANGE UP (ref 13–44)
MCHC RBC-ENTMCNC: 29 PG — SIGNIFICANT CHANGE UP (ref 27–34)
MCHC RBC-ENTMCNC: 32.5 GM/DL — SIGNIFICANT CHANGE UP (ref 32–36)
MCV RBC AUTO: 89.4 FL — SIGNIFICANT CHANGE UP (ref 80–100)
MONOCYTES # BLD AUTO: 0.49 K/UL — SIGNIFICANT CHANGE UP (ref 0–0.9)
MONOCYTES NFR BLD AUTO: 6.5 % — SIGNIFICANT CHANGE UP (ref 2–14)
NEUTROPHILS # BLD AUTO: 4.43 K/UL — SIGNIFICANT CHANGE UP (ref 1.8–7.4)
NEUTROPHILS NFR BLD AUTO: 58.7 % — SIGNIFICANT CHANGE UP (ref 43–77)
NRBC # BLD: 0 /100 WBCS — SIGNIFICANT CHANGE UP (ref 0–0)
NT-PROBNP SERPL-SCNC: 13 PG/ML — SIGNIFICANT CHANGE UP (ref 0–125)
PLATELET # BLD AUTO: 373 K/UL — SIGNIFICANT CHANGE UP (ref 150–400)
POTASSIUM SERPL-MCNC: 3.8 MMOL/L — SIGNIFICANT CHANGE UP (ref 3.5–5.3)
POTASSIUM SERPL-SCNC: 3.8 MMOL/L — SIGNIFICANT CHANGE UP (ref 3.5–5.3)
PROT SERPL-MCNC: 8.6 GM/DL — HIGH (ref 6–8.3)
PROTHROM AB SERPL-ACNC: 14.9 SEC — HIGH (ref 10.6–13.6)
RBC # BLD: 5.1 M/UL — SIGNIFICANT CHANGE UP (ref 4.2–5.8)
RBC # FLD: 13.2 % — SIGNIFICANT CHANGE UP (ref 10.3–14.5)
SARS-COV-2 RNA SPEC QL NAA+PROBE: SIGNIFICANT CHANGE UP
SODIUM SERPL-SCNC: 138 MMOL/L — SIGNIFICANT CHANGE UP (ref 135–145)
TROPONIN I SERPL-MCNC: <.015 NG/ML — SIGNIFICANT CHANGE UP (ref 0.01–0.04)
WBC # BLD: 7.55 K/UL — SIGNIFICANT CHANGE UP (ref 3.8–10.5)
WBC # FLD AUTO: 7.55 K/UL — SIGNIFICANT CHANGE UP (ref 3.8–10.5)

## 2021-04-22 PROCEDURE — 71045 X-RAY EXAM CHEST 1 VIEW: CPT | Mod: 26

## 2021-04-22 PROCEDURE — 93010 ELECTROCARDIOGRAM REPORT: CPT

## 2021-04-22 NOTE — ED PROVIDER NOTE - PHYSICAL EXAMINATION
VITAL SIGNS: I have reviewed nursing notes and confirm.   GEN: Well-developed; well-nourished; in mild respiratory acute distress. Speaking few words per sentences.  SKIN: Warm, pink, no rash, no diaphoresis, no cyanosis, well perfused.   HEAD: Normocephalic; atraumatic. No scalp lacerations, no abrasions.  NECK: Supple; non tender.   EYES: Pupils 3mm equal, round, reactive to light and accomodation, conjunctiva and sclera clear. Extra-ocular movements intact bilaterally.  ENT: No nasal discharge; airway clear. Trachea is midline. ORAL: No oropharyngeal exudates or erythema. Normal dentition.  CV: Regular rate and rhythm. S1, S2 normal; no murmurs, gallops, or rubs. No lower extremity pitting edema bilaterally. Capillary refill < 2 seconds throughout. Distal pulses intact 2+ throughout.  RESP: (+) right sided decreased lung sounds. No wheezes, rales, or rhonchi. (+) right sided pigtail in place, CDI dressing.  ABD: Normal bowel sounds, soft, non-distended, non-tender, no hepatosplenomegaly. No CVA tenderness bilaterally.  MSK: Normal range of motion and movement of all 4 extremities. No joint or muscular pain throughout. No clubbing.   BACK: No thoracolumbar midline or paravertebral tenderness. No step-offs or obvious deformities.  NEURO: Alert & oriented x 3, Grossly unremarkable. Sensory and motor intact throughout. No focal deficits. Gait: Fluid. Normal speech and coordination.   PSYCH: Cooperative, appropriate.

## 2021-04-22 NOTE — ED PROVIDER NOTE - CLINICAL SUMMARY MEDICAL DECISION MAKING FREE TEXT BOX
right sided chest pain w/ hx of right ptx s/p pig tail. to be transferred to [facility].

## 2021-04-22 NOTE — ED PROVIDER NOTE - WET READ LAUNCH FT
There is 1 Wet Read(s) to document. There are 2 Wet Read(s) to document. There are 3 Wet Read(s) to document.

## 2021-04-22 NOTE — ED PROVIDER NOTE - OBJECTIVE STATEMENT
40M PMHX OF COPD 40M PMHx COPD, prior PE, prior TB, spontaneous pneumothorax, 40M PMHx COPD, prior PE, prior TB, spontaneous pneumothorax, presenting with chest pain and shortness of breath starting this evening. Came to the ED, 95% on RA, speaking few words per sentence, mild respiratory distress. Improved on supplemental oxygen. Denies any nausea/vomiting, abdominal pain, headache, neck pain, neck stiffness, syncope, lightheadedness, trauma, falls.

## 2021-04-22 NOTE — ED ADULT NURSE NOTE - NS ED NURSE DISCH DISPOSITION
Transferred

## 2021-04-22 NOTE — ED PROVIDER NOTE - PROGRESS NOTE DETAILS
discussed with surgery, patient with repeat PTX, recommended hook up to pleurvac and suction, completed. patient improved. pending repeat CXR and surgical consult. repeat CXR still w/ PTX. consulted surgery. surgery at bedside, adjusted pig tail, clogged, pending repeat CXR discussed with dr howadr (CT surgery) request transfer to [facility]. discussed with patient.

## 2022-02-15 NOTE — ED ADULT NURSE NOTE - NS ED NURSE TRANSFER FORMS DT
For information on Fall & Injury Prevention, visit: [email or web address] OR  [email or web address] OR  https://www.cdc.gov/steadi/patient.html 22-Apr-2021 06:10

## 2022-09-07 NOTE — ED ADULT NURSE NOTE - FINAL NURSING ELECTRONIC SIGNATURE
Mr. Portillo returned your call, will wait for you to call him back  [phone]      Thanks  Valentina      
22-Apr-2021 06:09

## 2023-01-19 NOTE — PROCEDURAL SAFETY CHECKLIST WITH OR WITHOUT SEDATION - NSPREPROCLOCFT_GEN_ALL_CORE
Family was concerned that patient was "twitching" his fingers and feet. Patient denies any movements but per family patient at baseline "twitches" but feel that it worsened on 1/16. Unclear etiology as electrolytes are normal. May be due to sleep deprivation but patient recently started on metoclopramide. Cefepime was also started but seems more alert rather than hypoactive which tends to characterize cefepime induced encephalopathy.  - Does not fit clinically as akathisia but will hold metoclopramide  - Cont to monitor clinically ED

## 2025-01-27 NOTE — ED PROVIDER NOTE - PROGRESS NOTE ADDITIONAL1
Detail Level: Detailed Quality 130: Documentation Of Current Medications In The Medical Record: Current Medications Documented Quality 226: Preventive Care And Screening: Tobacco Use: Screening And Cessation Intervention: Patient screened for tobacco use and is an ex/non-smoker Quality 47: Advance Care Plan: Advance Care Planning discussed and documented; advance care plan or surrogate decision maker documented in the medical record. Additional Progress Note...

## 2025-05-13 NOTE — DISCHARGE NOTE PROVIDER - NSRESEARCHGRANT_MLMHIDDEN_GEN_A_CORE
Daiana called in regard to the patients Empower screening. They state they do not do this test for unaffected minors. They would need an additional dx code to be able to complete this screening. Please advise.   
Daiana johnston.   
Lm on vm  
Pt father Andre was informed. He is listed on patient communication form.  
yes